# Patient Record
Sex: FEMALE | Race: WHITE | Employment: FULL TIME | URBAN - METROPOLITAN AREA
[De-identification: names, ages, dates, MRNs, and addresses within clinical notes are randomized per-mention and may not be internally consistent; named-entity substitution may affect disease eponyms.]

---

## 2017-02-02 PROBLEM — Z31.83 IN VITRO FERTILIZATION: Status: ACTIVE | Noted: 2017-02-02

## 2017-02-02 PROBLEM — I10 ESSENTIAL HYPERTENSION: Status: ACTIVE | Noted: 2017-02-02

## 2017-02-02 PROBLEM — O30.109: Status: ACTIVE | Noted: 2017-02-02

## 2017-02-02 PROCEDURE — 87340 HEPATITIS B SURFACE AG IA: CPT | Performed by: OBSTETRICS & GYNECOLOGY

## 2017-02-02 PROCEDURE — 87086 URINE CULTURE/COLONY COUNT: CPT | Performed by: OBSTETRICS & GYNECOLOGY

## 2017-02-02 PROCEDURE — 86850 RBC ANTIBODY SCREEN: CPT | Performed by: OBSTETRICS & GYNECOLOGY

## 2017-02-02 PROCEDURE — 36415 COLL VENOUS BLD VENIPUNCTURE: CPT | Performed by: OBSTETRICS & GYNECOLOGY

## 2017-02-02 PROCEDURE — 87591 N.GONORRHOEAE DNA AMP PROB: CPT | Performed by: OBSTETRICS & GYNECOLOGY

## 2017-02-02 PROCEDURE — 86900 BLOOD TYPING SEROLOGIC ABO: CPT | Performed by: OBSTETRICS & GYNECOLOGY

## 2017-02-02 PROCEDURE — 86780 TREPONEMA PALLIDUM: CPT | Performed by: OBSTETRICS & GYNECOLOGY

## 2017-02-02 PROCEDURE — 85025 COMPLETE CBC W/AUTO DIFF WBC: CPT | Performed by: OBSTETRICS & GYNECOLOGY

## 2017-02-02 PROCEDURE — 87389 HIV-1 AG W/HIV-1&-2 AB AG IA: CPT | Performed by: OBSTETRICS & GYNECOLOGY

## 2017-02-02 PROCEDURE — 86901 BLOOD TYPING SEROLOGIC RH(D): CPT | Performed by: OBSTETRICS & GYNECOLOGY

## 2017-02-02 PROCEDURE — 87491 CHLMYD TRACH DNA AMP PROBE: CPT | Performed by: OBSTETRICS & GYNECOLOGY

## 2017-02-02 PROCEDURE — 86762 RUBELLA ANTIBODY: CPT | Performed by: OBSTETRICS & GYNECOLOGY

## 2017-02-13 ENCOUNTER — OFFICE VISIT (OUTPATIENT)
Dept: PERINATAL CARE | Facility: HOSPITAL | Age: 32
End: 2017-02-13
Attending: OBSTETRICS & GYNECOLOGY
Payer: COMMERCIAL

## 2017-02-13 VITALS
HEART RATE: 76 BPM | SYSTOLIC BLOOD PRESSURE: 155 MMHG | HEIGHT: 64 IN | WEIGHT: 230 LBS | DIASTOLIC BLOOD PRESSURE: 96 MMHG | BODY MASS INDEX: 39.27 KG/M2

## 2017-02-13 DIAGNOSIS — I10 ESSENTIAL HYPERTENSION: Primary | ICD-10-CM

## 2017-02-13 DIAGNOSIS — Z31.83 IN VITRO FERTILIZATION: ICD-10-CM

## 2017-02-13 DIAGNOSIS — E28.2 PCOS (POLYCYSTIC OVARIAN SYNDROME): ICD-10-CM

## 2017-02-13 DIAGNOSIS — IMO0002: ICD-10-CM

## 2017-02-13 DIAGNOSIS — O30.109 TRIPLET PREGNANCY, ANTEPARTUM, UNSPECIFIED MULTIPLE GESTATION TYPE: ICD-10-CM

## 2017-02-13 PROCEDURE — 99244 OFF/OP CNSLTJ NEW/EST MOD 40: CPT

## 2017-02-13 NOTE — PROGRESS NOTES
Indication: ivf, htn, triplets.   ____________________________________________________________________________  History: Age: 27 years.  : 2 Para: 0.  ____________________________________________________________________________  Dating:  LMP: / subsequently divides into three separate individuals. The incidence of MZ twins is relatively stable worldwide at 4 to 5 per 1000 births.              The mortality rate of infants is higher in multiple than roger pregnancies because of the increased r recommended at age 29 for triplets. Growth restriction and discordant growth are frequent complications of multiple pregnancies and contribute to  morbidity and mortality.   The higher rate of IUGR in triplet pregnancies is probab associated with increased risk of birth defects including congenital heart defects ranging from 1.5 -6%. Fetal echocardiography is recommended in all IVF patients. We have also seen an increase in prematurity.             We discussed at length the sonya improve  outcome overall. 8.  Obtain baseline 24 hr urine and PIH labs. 9.  agree with stopping Lovenox  10.  check blood pressure at home twice per week     Thank you for allowing me to participate in the care of your patient.   Isaias

## 2017-02-17 ENCOUNTER — APPOINTMENT (OUTPATIENT)
Dept: ULTRASOUND IMAGING | Facility: HOSPITAL | Age: 32
End: 2017-02-17
Attending: EMERGENCY MEDICINE
Payer: COMMERCIAL

## 2017-02-17 ENCOUNTER — HOSPITAL ENCOUNTER (EMERGENCY)
Facility: HOSPITAL | Age: 32
Discharge: HOME | End: 2017-02-17
Attending: EMERGENCY MEDICINE
Payer: COMMERCIAL

## 2017-02-17 VITALS
BODY MASS INDEX: 37.56 KG/M2 | HEART RATE: 79 BPM | HEIGHT: 64 IN | SYSTOLIC BLOOD PRESSURE: 138 MMHG | TEMPERATURE: 97 F | WEIGHT: 220 LBS | RESPIRATION RATE: 18 BRPM | DIASTOLIC BLOOD PRESSURE: 93 MMHG | OXYGEN SATURATION: 99 %

## 2017-02-17 DIAGNOSIS — O20.9 VAGINAL BLEEDING BEFORE 22 WEEKS GESTATION: Primary | ICD-10-CM

## 2017-02-17 LAB
BASOPHILS # BLD AUTO: 0.04 X10(3) UL (ref 0–0.1)
BASOPHILS NFR BLD AUTO: 0.4 %
EOSINOPHIL # BLD AUTO: 0.15 X10(3) UL (ref 0–0.3)
EOSINOPHIL NFR BLD AUTO: 1.5 %
ERYTHROCYTE [DISTWIDTH] IN BLOOD BY AUTOMATED COUNT: 13.5 % (ref 11.5–16)
HCT VFR BLD AUTO: 33.1 % (ref 34–50)
HGB BLD-MCNC: 11.6 G/DL (ref 12–16)
IMMATURE GRANULOCYTE COUNT: 0.04 X10(3) UL (ref 0–1)
IMMATURE GRANULOCYTE RATIO %: 0.4 %
LYMPHOCYTES # BLD AUTO: 2.55 X10(3) UL (ref 0.9–4)
LYMPHOCYTES NFR BLD AUTO: 25.3 %
MCH RBC QN AUTO: 28.7 PG (ref 27–33.2)
MCHC RBC AUTO-ENTMCNC: 35 G/DL (ref 31–37)
MCV RBC AUTO: 81.9 FL (ref 81–100)
MONOCYTES # BLD AUTO: 0.59 X10(3) UL (ref 0.1–0.6)
MONOCYTES NFR BLD AUTO: 5.8 %
NEUTROPHIL ABS PRELIM: 6.72 X10 (3) UL (ref 1.3–6.7)
NEUTROPHILS # BLD AUTO: 6.72 X10(3) UL (ref 1.3–6.7)
NEUTROPHILS NFR BLD AUTO: 66.6 %
PLATELET # BLD AUTO: 319 10(3)UL (ref 150–450)
RBC # BLD AUTO: 4.04 X10(6)UL (ref 3.8–5.1)
RED CELL DISTRIBUTION WIDTH-SD: 39.8 FL (ref 35.1–46.3)
RH BLOOD TYPE: POSITIVE
WBC # BLD AUTO: 10.1 X10(3) UL (ref 4–13)

## 2017-02-17 PROCEDURE — 96360 HYDRATION IV INFUSION INIT: CPT

## 2017-02-17 PROCEDURE — 86900 BLOOD TYPING SEROLOGIC ABO: CPT | Performed by: EMERGENCY MEDICINE

## 2017-02-17 PROCEDURE — 99284 EMERGENCY DEPT VISIT MOD MDM: CPT

## 2017-02-17 PROCEDURE — 76817 TRANSVAGINAL US OBSTETRIC: CPT

## 2017-02-17 PROCEDURE — 84702 CHORIONIC GONADOTROPIN TEST: CPT | Performed by: EMERGENCY MEDICINE

## 2017-02-17 PROCEDURE — 96361 HYDRATE IV INFUSION ADD-ON: CPT

## 2017-02-17 PROCEDURE — 86901 BLOOD TYPING SEROLOGIC RH(D): CPT | Performed by: EMERGENCY MEDICINE

## 2017-02-17 PROCEDURE — 85025 COMPLETE CBC W/AUTO DIFF WBC: CPT | Performed by: EMERGENCY MEDICINE

## 2017-02-17 PROCEDURE — 99285 EMERGENCY DEPT VISIT HI MDM: CPT

## 2017-02-17 PROCEDURE — 76801 OB US < 14 WKS SINGLE FETUS: CPT

## 2017-02-17 PROCEDURE — 76802 OB US < 14 WKS ADDL FETUS: CPT

## 2017-02-17 RX ORDER — ENOXAPARIN SODIUM 150 MG/ML
INJECTION SUBCUTANEOUS DAILY
COMMUNITY
End: 2017-03-13 | Stop reason: ALTCHOICE

## 2017-02-17 NOTE — ED INITIAL ASSESSMENT (HPI)
Pt states having bleeding this am around 11. Pt states 11 weeks pregnancy with twins, was triplets spontaneous reduced to twins per last week u/s.

## 2017-02-17 NOTE — ED PROVIDER NOTES
Patient Seen in: [facility] Emergency Department    History   Patient presents with:  Pregnancy Issues (gynecologic)    Stated Complaint: vag bleeding, 11 wks pregnant    HPI    39-year-old white female who is a  2 para 0 who recently underwe Status: Never Smoker                      Smokeless Status: Never Used                        Alcohol Use: No                 Comment: socially.   one drink weekly max      Review of Systems    Positive for stated complaint: vag bleeding, 11 wks pregnant  O Notable for the following:     Hcg Quantitative >473970.0 (*)     All other components within normal limits   CBC W/ DIFFERENTIAL - Abnormal; Notable for the following:     HGB 11.6 (*)     HCT 33.1 (*)     Neutrophil Absolute Prelim 6.72 (*)     [facility] emergency room and laboratory studies sent. Workup reveals the known intrauterine fetal demise of fetus A.  B and C fetuses appear normal.  I spoke with her OB/GYN physician Dr. Matt Navarro and we discussed the case.   Patient will be discharged home to follow-u

## 2017-03-02 PROBLEM — O30.039 MONOCHORIONIC DIAMNIOTIC TWIN GESTATION: Status: ACTIVE | Noted: 2017-03-02

## 2017-03-20 ENCOUNTER — OFFICE VISIT (OUTPATIENT)
Dept: PERINATAL CARE | Facility: HOSPITAL | Age: 32
End: 2017-03-20
Attending: OBSTETRICS & GYNECOLOGY
Payer: COMMERCIAL

## 2017-03-20 VITALS
SYSTOLIC BLOOD PRESSURE: 153 MMHG | HEART RATE: 82 BPM | BODY MASS INDEX: 40 KG/M2 | DIASTOLIC BLOOD PRESSURE: 86 MMHG | WEIGHT: 234 LBS

## 2017-03-20 DIAGNOSIS — O30.032 MONOCHORIONIC DIAMNIOTIC TWIN GESTATION IN SECOND TRIMESTER: ICD-10-CM

## 2017-03-20 DIAGNOSIS — O16.2 HYPERTENSION COMPLICATING PREGNANCY, SECOND TRIMESTER: ICD-10-CM

## 2017-03-20 DIAGNOSIS — O09.812 HIGH RISK PREGNANCY DUE TO ASSISTED REPRODUCTIVE TECHNOLOGY IN SECOND TRIMESTER: ICD-10-CM

## 2017-03-20 DIAGNOSIS — IMO0002: Primary | ICD-10-CM

## 2017-03-20 PROCEDURE — 76810 OB US >/= 14 WKS ADDL FETUS: CPT

## 2017-03-20 PROCEDURE — 76820 UMBILICAL ARTERY ECHO: CPT

## 2017-03-20 PROCEDURE — 76805 OB US >/= 14 WKS SNGL FETUS: CPT

## 2017-03-20 PROCEDURE — 99214 OFFICE O/P EST MOD 30 MIN: CPT

## 2017-04-04 ENCOUNTER — OFFICE VISIT (OUTPATIENT)
Dept: PERINATAL CARE | Facility: HOSPITAL | Age: 32
End: 2017-04-04
Attending: OBSTETRICS & GYNECOLOGY
Payer: COMMERCIAL

## 2017-04-04 VITALS
DIASTOLIC BLOOD PRESSURE: 86 MMHG | SYSTOLIC BLOOD PRESSURE: 151 MMHG | HEART RATE: 95 BPM | WEIGHT: 234 LBS | BODY MASS INDEX: 40 KG/M2

## 2017-04-04 DIAGNOSIS — O09.812 PREGNANCY RESULTING FROM ASSISTED REPRODUCTIVE TECHNOLOGY, SECOND TRIMESTER: ICD-10-CM

## 2017-04-04 DIAGNOSIS — O30.032 MONOCHORIONIC DIAMNIOTIC TWIN GESTATION IN SECOND TRIMESTER: Primary | ICD-10-CM

## 2017-04-04 DIAGNOSIS — O30.109 TRIPLET PREGNANCY, ANTEPARTUM, UNSPECIFIED MULTIPLE GESTATION TYPE: ICD-10-CM

## 2017-04-04 DIAGNOSIS — O16.2 HYPERTENSION COMPLICATING PREGNANCY, SECOND TRIMESTER: ICD-10-CM

## 2017-04-04 PROCEDURE — 76820 UMBILICAL ARTERY ECHO: CPT

## 2017-04-04 PROCEDURE — 99214 OFFICE O/P EST MOD 30 MIN: CPT

## 2017-04-04 PROCEDURE — 76815 OB US LIMITED FETUS(S): CPT

## 2017-04-04 NOTE — PROGRESS NOTES
Servando Salazar    Dear Dr. Harrison Gardiner    Thank you for requesting ultrasound evaluation and maternal fetal medicine consultation on your patient Rosanna Head.   As you are aware she is a 27year old female  with a twin p Doppler:  Fetus 1:  Umbilical Artery: PS -61.5 cm/s    ED -10.43 cm/s    S/D ratio 2.88     RI 0.65     PI 0.98     TAMX -20.02 cm/s   Impression: normal umbilical artery blood flow (S/D ratio).      Fetus 2:  Umbilical Artery: PS -43.9 cm/s    ED -9.47 cm/ weeks  TTTS assessment every two weeks beginning at 16 weeks  assess AFV and bladder in both twins  measurement of middle cerebral artery peak systolic velocity (MCA-PSV) in both fetuses at 26 to 28 weeks for early detection of twin anemia-polycythemia seq

## 2017-04-20 ENCOUNTER — OFFICE VISIT (OUTPATIENT)
Dept: PERINATAL CARE | Facility: HOSPITAL | Age: 32
End: 2017-04-20
Attending: OBSTETRICS & GYNECOLOGY
Payer: COMMERCIAL

## 2017-04-20 VITALS
DIASTOLIC BLOOD PRESSURE: 86 MMHG | WEIGHT: 246 LBS | HEART RATE: 93 BPM | SYSTOLIC BLOOD PRESSURE: 144 MMHG | BODY MASS INDEX: 42 KG/M2

## 2017-04-20 DIAGNOSIS — I10 ESSENTIAL HYPERTENSION: ICD-10-CM

## 2017-04-20 DIAGNOSIS — O30.032 MONOCHORIONIC DIAMNIOTIC TWIN GESTATION IN SECOND TRIMESTER: Primary | ICD-10-CM

## 2017-04-20 DIAGNOSIS — O16.2 HYPERTENSION COMPLICATING PREGNANCY, SECOND TRIMESTER: ICD-10-CM

## 2017-04-20 PROCEDURE — 76815 OB US LIMITED FETUS(S): CPT

## 2017-04-20 PROCEDURE — 99213 OFFICE O/P EST LOW 20 MIN: CPT

## 2017-04-20 PROCEDURE — 76820 UMBILICAL ARTERY ECHO: CPT

## 2017-04-20 NOTE — PROGRESS NOTES
Finn Montes De Oca   Dear Dr. Susan Mcgregor   Thank you for requesting ultrasound evaluation and maternal fetal medicine consultation on your patient Radha Burnett.  As you are aware she is a 27year old female  with a twin preg ratio 2.75     RI 0.64     PI 0.95     TAMX 20.44 cm/s   Impression: normal umbilical artery blood flow (S/D ratio).      Fetus 2:  Umbilical Artery: PS 53.5 cm/s    ED 11.47 cm/s    S/D ratio 3.06     RI 0.67     PI 1.02     TAMX 20.37 cm/s   Impression: n echocardiogram at 22-24 weeks   Monthly growth ultrasounds   Weekly NST's at 32 weeks. Monitor for signs or symptoms of  labor, preeclampsia or fetal growth disturbances.    In the absence of other maternal or fetal indications, delivery for monoch

## 2017-04-24 PROCEDURE — 84156 ASSAY OF PROTEIN URINE: CPT | Performed by: OBSTETRICS & GYNECOLOGY

## 2017-05-02 ENCOUNTER — OFFICE VISIT (OUTPATIENT)
Dept: PERINATAL CARE | Facility: HOSPITAL | Age: 32
End: 2017-05-02
Attending: OBSTETRICS & GYNECOLOGY
Payer: COMMERCIAL

## 2017-05-02 VITALS
BODY MASS INDEX: 42 KG/M2 | WEIGHT: 246 LBS | SYSTOLIC BLOOD PRESSURE: 147 MMHG | DIASTOLIC BLOOD PRESSURE: 93 MMHG | HEART RATE: 84 BPM

## 2017-05-02 DIAGNOSIS — Z31.83 IN VITRO FERTILIZATION: ICD-10-CM

## 2017-05-02 DIAGNOSIS — O30.109 TRIPLET PREGNANCY, ANTEPARTUM, UNSPECIFIED MULTIPLE GESTATION TYPE: Primary | ICD-10-CM

## 2017-05-02 DIAGNOSIS — O30.032 MONOCHORIONIC DIAMNIOTIC TWIN GESTATION IN SECOND TRIMESTER: ICD-10-CM

## 2017-05-02 DIAGNOSIS — I10 ESSENTIAL HYPERTENSION: ICD-10-CM

## 2017-05-02 DIAGNOSIS — O16.2 HYPERTENSION COMPLICATING PREGNANCY, SECOND TRIMESTER: ICD-10-CM

## 2017-05-02 PROCEDURE — 99214 OFFICE O/P EST MOD 30 MIN: CPT

## 2017-05-02 NOTE — PROGRESS NOTES
Indication: ivf, htn, mono/di. ____________________________________________________________________________  History: Age: 34 years.  : 2 Para: 0.  ____________________________________________________________________________  Dating:  LMP: 20 Ratio 1.090  13th%  FL/AC Ratio 0.205  n/a  BPD/FL Ratio 1.400  17th%  HC/FL Ratio 5.311  52nd%  EFW (lbs/oz) 0 lbs 15 ozs  EFW (g) 428 g  n/a     Fetal heart activity: present. Fetal heart rate: 149 bpm.   Fetal presentation: transverse.    Amniotic fluid: I informed the patient that chronic hypertension increases the risk of pre-eclampsia, placental abruption, IUGR and fetal demise and possible need for  delivery. The signs and symptoms of preeclampsia were discussed. S:  Doing well.   No compl to call with any questions or concerns. Total patient time was  25 minutes in evaluation, consultation, and coordination of care. Greater than 50% of this time was spent in face to face discussion with the patient.

## 2017-05-16 ENCOUNTER — OFFICE VISIT (OUTPATIENT)
Dept: PERINATAL CARE | Facility: HOSPITAL | Age: 32
End: 2017-05-16
Attending: OBSTETRICS & GYNECOLOGY
Payer: COMMERCIAL

## 2017-05-16 VITALS
DIASTOLIC BLOOD PRESSURE: 81 MMHG | HEART RATE: 99 BPM | WEIGHT: 253 LBS | SYSTOLIC BLOOD PRESSURE: 139 MMHG | BODY MASS INDEX: 43 KG/M2

## 2017-05-16 DIAGNOSIS — O09.812 PREGNANCY RESULTING FROM ASSISTED REPRODUCTIVE TECHNOLOGY, SECOND TRIMESTER: ICD-10-CM

## 2017-05-16 DIAGNOSIS — O30.109 TRIPLET PREGNANCY, ANTEPARTUM, UNSPECIFIED MULTIPLE GESTATION TYPE: ICD-10-CM

## 2017-05-16 DIAGNOSIS — O16.2 HYPERTENSION COMPLICATING PREGNANCY, SECOND TRIMESTER: ICD-10-CM

## 2017-05-16 DIAGNOSIS — I10 ESSENTIAL HYPERTENSION: ICD-10-CM

## 2017-05-16 DIAGNOSIS — O30.042 DICHORIONIC DIAMNIOTIC TWIN PREGNANCY IN SECOND TRIMESTER: Primary | ICD-10-CM

## 2017-05-16 PROBLEM — O30.039 MONOCHORIONIC DIAMNIOTIC TWIN GESTATION: Status: RESOLVED | Noted: 2017-03-02 | Resolved: 2017-05-16

## 2017-05-16 PROCEDURE — 93325 DOPPLER ECHO COLOR FLOW MAPG: CPT

## 2017-05-16 PROCEDURE — 76827 ECHO EXAM OF FETAL HEART: CPT

## 2017-05-16 PROCEDURE — 99215 OFFICE O/P EST HI 40 MIN: CPT

## 2017-05-16 PROCEDURE — 76815 OB US LIMITED FETUS(S): CPT

## 2017-05-16 PROCEDURE — 76825 ECHO EXAM OF FETAL HEART: CPT

## 2017-05-16 NOTE — PROGRESS NOTES
Finn Montes De Oca    Dear Dr. oLnnie Gamboa    Thank you for requesting maternal fetal medicine consultation and echocardiogram on your patient Radha Burnett.   As you are aware she is a 34year old female  with a twin pregnancy trachea  Rhythm:  Sinus    Fetus 2:  : Aubrey Gasca MD    Image quality:  Adequate  Situs:  Normal  Position of stomach:  Left sided  Heart size:  Normal  Position of apex:  normal  Systemic veins:  Normal  Pulmonary veins:  suboptimal  Atrial s methyldopa 1000 mg p.o. 3 times daily, OB recently added labetalol 100 mg po q PM    RECOMMENDATIONS:  · Continue care with Dr. Romayne Awkward monthly growth ultrasounds. Weekly NST's at 32 weeks.   Monitor for signs or symptoms of  labor, preeclam

## 2017-05-17 PROBLEM — O30.039 MONOCHORIONIC DIAMNIOTIC TWIN GESTATION: Status: RESOLVED | Noted: 2017-05-17 | Resolved: 2017-05-17

## 2017-05-17 PROBLEM — O30.042 DICHORIONIC DIAMNIOTIC TWIN PREGNANCY IN SECOND TRIMESTER: Status: RESOLVED | Noted: 2017-05-16 | Resolved: 2017-05-17

## 2017-05-17 PROBLEM — O30.039 MONOCHORIONIC DIAMNIOTIC TWIN GESTATION: Status: ACTIVE | Noted: 2017-05-17

## 2017-05-19 ENCOUNTER — APPOINTMENT (OUTPATIENT)
Dept: ULTRASOUND IMAGING | Age: 32
End: 2017-05-19
Attending: EMERGENCY MEDICINE
Payer: COMMERCIAL

## 2017-05-19 ENCOUNTER — HOSPITAL ENCOUNTER (EMERGENCY)
Age: 32
Discharge: HOME | End: 2017-05-19
Attending: EMERGENCY MEDICINE
Payer: COMMERCIAL

## 2017-05-19 VITALS
HEART RATE: 74 BPM | DIASTOLIC BLOOD PRESSURE: 72 MMHG | SYSTOLIC BLOOD PRESSURE: 139 MMHG | BODY MASS INDEX: 41.32 KG/M2 | HEIGHT: 65 IN | OXYGEN SATURATION: 99 % | WEIGHT: 248 LBS | RESPIRATION RATE: 20 BRPM | TEMPERATURE: 98 F

## 2017-05-19 DIAGNOSIS — R07.89 CHEST PAIN, NON-CARDIAC: Primary | ICD-10-CM

## 2017-05-19 DIAGNOSIS — O30.002 TWIN GESTATION IN SECOND TRIMESTER, UNSPECIFIED MULTIPLE GESTATION TYPE: ICD-10-CM

## 2017-05-19 PROCEDURE — 99285 EMERGENCY DEPT VISIT HI MDM: CPT

## 2017-05-19 PROCEDURE — 81001 URINALYSIS AUTO W/SCOPE: CPT | Performed by: EMERGENCY MEDICINE

## 2017-05-19 PROCEDURE — 93971 EXTREMITY STUDY: CPT | Performed by: EMERGENCY MEDICINE

## 2017-05-19 PROCEDURE — 84484 ASSAY OF TROPONIN QUANT: CPT | Performed by: EMERGENCY MEDICINE

## 2017-05-19 PROCEDURE — 93005 ELECTROCARDIOGRAM TRACING: CPT

## 2017-05-19 PROCEDURE — 93010 ELECTROCARDIOGRAM REPORT: CPT

## 2017-05-19 PROCEDURE — 80053 COMPREHEN METABOLIC PANEL: CPT | Performed by: EMERGENCY MEDICINE

## 2017-05-19 PROCEDURE — 87086 URINE CULTURE/COLONY COUNT: CPT | Performed by: EMERGENCY MEDICINE

## 2017-05-19 PROCEDURE — 36415 COLL VENOUS BLD VENIPUNCTURE: CPT

## 2017-05-19 PROCEDURE — 85025 COMPLETE CBC W/AUTO DIFF WBC: CPT | Performed by: EMERGENCY MEDICINE

## 2017-05-20 NOTE — ED NOTES
PT RETURNED FROM ULTRASOUND ON CART PER TECH. STS RETURNING PAIN. MD NOTIFIED. PT DECLINES PAIN MEDICATION.

## 2017-05-20 NOTE — ED PROVIDER NOTES
Patient Seen in: THE [facility] Emergency Department In [place]    History   Patient presents with:  Chest Pain Angina (cardiovascular)    Stated Complaint: chest pain times 2 days 24 weeks preg     HPI    24-year-old woman who is 24 weeks pregnant with twins vital signs reviewed. All other systems reviewed and negative except as noted above. PSFH elements reviewed from today and agreed except as otherwise stated in HPI.     Physical Exam       ED Triage Vitals   BP 05/19/17 1934 133/76 mmHg   Pulse 05/1 for panel order CBC WITH DIFFERENTIAL WITH PLATELET.   Procedure                               Abnormality         Status                     ---------                               -----------         ------                     CBC W/ DIFFERENTIAL[[id number]

## 2017-06-12 ENCOUNTER — OFFICE VISIT (OUTPATIENT)
Dept: FAMILY MEDICINE CLINIC | Facility: CLINIC | Age: 32
End: 2017-06-12

## 2017-06-12 VITALS
RESPIRATION RATE: 18 BRPM | OXYGEN SATURATION: 98 % | TEMPERATURE: 98 F | WEIGHT: 272 LBS | SYSTOLIC BLOOD PRESSURE: 120 MMHG | DIASTOLIC BLOOD PRESSURE: 80 MMHG | HEART RATE: 69 BPM | BODY MASS INDEX: 45 KG/M2

## 2017-06-12 DIAGNOSIS — H69.93 EUSTACHIAN TUBE DISORDER, BILATERAL: Primary | ICD-10-CM

## 2017-06-12 PROCEDURE — 99203 OFFICE O/P NEW LOW 30 MIN: CPT | Performed by: PHYSICIAN ASSISTANT

## 2017-06-12 RX ORDER — FLUTICASONE PROPIONATE 50 MCG
2 SPRAY, SUSPENSION (ML) NASAL DAILY
Qty: 1 BOTTLE | Refills: 0 | Status: SHIPPED | OUTPATIENT
Start: 2017-06-12 | End: 2017-06-30

## 2017-06-12 NOTE — PROGRESS NOTES
CHIEF COMPLAINT:   Patient presents with:  Ear Pain: pt c\o of ear pain rt ear, x 2days         HPI:   Radha Dorsey is a 34year old female who presents for right ear pain for 2 days. Admits to stuffy nose. Denies decreased hearing/discharge/tinnitus. respiratory rate. Normal effort. No wheezing. No rales or crackles. . No decreased BS. CARDIO: RRR without murmur  LYMPH: No cervical or supraclavicular lymphadenopathy.        ASSESSMENT AND PLAN:       Eustachian tube disorder, bilateral  -     Flutica

## 2017-06-14 ENCOUNTER — HOSPITAL ENCOUNTER (INPATIENT)
Facility: HOSPITAL | Age: 32
LOS: 7 days | Discharge: HOME | End: 2017-06-21
Attending: OBSTETRICS & GYNECOLOGY | Admitting: OBSTETRICS & GYNECOLOGY
Payer: COMMERCIAL

## 2017-06-14 PROBLEM — Z34.90 PREGNANCY: Status: ACTIVE | Noted: 2017-06-14

## 2017-06-14 RX ORDER — BETAMETHASONE SODIUM PHOSPHATE AND BETAMETHASONE ACETATE 3; 3 MG/ML; MG/ML
12 INJECTION, SUSPENSION INTRA-ARTICULAR; INTRALESIONAL; INTRAMUSCULAR; SOFT TISSUE EVERY 24 HOURS
Status: DISCONTINUED | OUTPATIENT
Start: 2017-06-14 | End: 2017-06-14

## 2017-06-14 RX ORDER — ASPIRIN 81 MG/1
81 TABLET ORAL DAILY
Status: DISCONTINUED | OUTPATIENT
Start: 2017-06-14 | End: 2017-06-17 | Stop reason: HOSPADM

## 2017-06-14 RX ORDER — FLUTICASONE PROPIONATE 50 MCG
2 SPRAY, SUSPENSION (ML) NASAL DAILY
Status: DISCONTINUED | OUTPATIENT
Start: 2017-06-14 | End: 2017-06-17 | Stop reason: HOSPADM

## 2017-06-14 RX ORDER — LABETALOL 200 MG/1
100 TABLET, FILM COATED ORAL DAILY
Status: DISCONTINUED | OUTPATIENT
Start: 2017-06-14 | End: 2017-06-15

## 2017-06-14 RX ORDER — METHYLDOPA 250 MG/1
1000 TABLET, FILM COATED ORAL 3 TIMES DAILY
Status: DISCONTINUED | OUTPATIENT
Start: 2017-06-14 | End: 2017-06-17 | Stop reason: HOSPADM

## 2017-06-14 RX ORDER — BETAMETHASONE SODIUM PHOSPHATE AND BETAMETHASONE ACETATE 3; 3 MG/ML; MG/ML
12 INJECTION, SUSPENSION INTRA-ARTICULAR; INTRALESIONAL; INTRAMUSCULAR; SOFT TISSUE EVERY 24 HOURS
Status: COMPLETED | OUTPATIENT
Start: 2017-06-14 | End: 2017-06-15

## 2017-06-14 NOTE — PLAN OF CARE
Problem: Patient/Family Goals  Goal: Patient/Family Long Term Goal  Patient’s Long Term Goal: Maintain pregnancy    Interventions:  -  - See additional Care Plan goals for specific interventions   Outcome: Progressing  Goal: Patient/Family Short Term Goal

## 2017-06-14 NOTE — H&P
[facility]  History & Physical    SUBJECTIVE:    Angely Lehman is a 34year old   mono/di twin gestation at 33w11d who presents with elevated BP in the office. Pt with history of cHTN, on methyldopa and labetalol.     IVF conception, origin [address] 5' 4\" (1.626 m) 270 lb (122.471 kg)   06/14/17 1254 146/88 mmHg 86 - -   06/14/17 1224 151/89 mmHg 93 - -   06/14/17 1209 150/90 mmHg 94 - -   06/14/17 1154 148/84 mmHg 85 - -   06/14/17 1139 146/89 mmHg 90 - -   06/14/17 1124 141/80 mmHg 83 - -

## 2017-06-14 NOTE — CONSULTS
[facility] Patient Status:  Observation    1985 MRN [id number]   [facility] 1NW-A Attending Rachelle Medina MD   Hosp Day # 0 PCP Bentley Zayas MD     SUBJECTIVE:  Reason for Admission: Social History:     Smoking status: Never Smoker     Smokeless tobacco: Never Used    Alcohol Use: No    Comment: socially.   one drink weekly max        Review of Systems:  Unremarkable except as above    OBJECTIVE:  Pulse:  [82-94] 86  BP: (141-178) fossa, cavum septi pellucidi.   Spine: appears normal but suboptimal  Heart: suboptimal.    Fetus 2:  Biometry:  BPD 65.4 mm 7th% 26w3d (25w5d to 27w1d)  .2 mm <5th% 26w0d (24w0d to 28w0d)  .4 mm 50th% 28w0d (27w2d to 28w5d)  FL 51.2 mm 26th% 27 Review:     Lab Results  Component Value Date   WBC 12.2 06/14/2017   HGB 11.0 06/14/2017   HCT 32.1 06/14/2017   .0 06/14/2017   CREATSERUM 0.76 06/14/2017   BUN 6 06/14/2017    06/14/2017   K 4.1 06/14/2017    06/14/2017   CO2 23.0 06/ (5 to 7 percent) in women who had mild preeclampsia during the first pregnancy.              The ability to predict preeclampsia is currently of limited benefit because neither the development of the disorder nor its progression from mild to severe disease

## 2017-06-14 NOTE — IMAGING NOTE
Indication: htn.   ____________________________________________________________________________  History: Age: 34 years.  : 2 Para: 0.  ____________________________________________________________________________  Dating:  LMP: 2016 EDC: / Intertwin EFW discordance: 3.7 %. Fetal Anatomy:  Visualized with normal appearance: head, gastrointestinal tract, kidneys, bladder. Brain: Visualized and normal appearance: brain parenchyma, cerebellum, posterior fossa.   Spine: appears normal but

## 2017-06-15 RX ORDER — LABETALOL 200 MG/1
200 TABLET, FILM COATED ORAL 2 TIMES DAILY
Status: DISCONTINUED | OUTPATIENT
Start: 2017-06-15 | End: 2017-06-17 | Stop reason: HOSPADM

## 2017-06-15 RX ORDER — CALCIUM CARBONATE 200(500)MG
500 TABLET,CHEWABLE ORAL 3 TIMES DAILY PRN
Status: DISCONTINUED | OUTPATIENT
Start: 2017-06-15 | End: 2017-06-17 | Stop reason: HOSPADM

## 2017-06-15 NOTE — PROGRESS NOTES
Pt complaints: none    /78 mmHg  Pulse 86  Temp(Src) 98.4 °F (36.9 °C) (Oral)  Resp 20  Ht 5' 4\" (1.626 m)  Wt 270 lb (122.471 kg)  BMI 46.32 kg/m2  LMP 11/29/2016    Abdomen: gravid, nontender  Cervix: deferred  Extremities: no edema, symmetric and

## 2017-06-15 NOTE — CONSULTS
[facility]  Maternal Fetal Medicine Progress Note    Radha Dorsey Patient Status:  Inpatient    1985 MRN [id number]   [facility] 1NW-A Attending Bryant Shah MD   Hosp Day # 1 PCP Lois Martinez MD     SUBJECTIVE:  Patient of chronic hypertension versus superimposed preeclampsia: 24 hour urine pending. Serum labs not reflective of severe features of preeclampsia.     RECOMENDATIONS:   · Complete 24 hour urine collection  · Complete betamethsone  · Increase labetalol to 200 m

## 2017-06-16 RX ORDER — CHOLECALCIFEROL (VITAMIN D3) 25 MCG
1 TABLET,CHEWABLE ORAL DAILY
Status: DISCONTINUED | OUTPATIENT
Start: 2017-06-17 | End: 2017-06-17 | Stop reason: HOSPADM

## 2017-06-16 NOTE — PROGRESS NOTES
Patient will be using their own BP cuff and monitor at home. BP on hospital equipment 128/71 on home equipment 131/68. Patient wanted to compare units.

## 2017-06-16 NOTE — CONSULTS
[facility]  Maternal Fetal Medicine Progress Note    Jarad Salazar Patient Status:  Inpatient    1985 MRN [id number]   [facility] 1NW-A Attending Katie Cates MD   Hosp Day # 2 PCP Marylene Bad, MD     SUBJECTIVE:  Patient p.o. 3 times daily  Repeat labs in AM.  If discharged:   Twice-weekly antepartum monitoring consisting of weekly NSTs and weekly BPP's and umbilical artery Doppler assessments   Continue growth ultrasounds every 2 weeks   Serum laboratory studies every 1-2

## 2017-06-16 NOTE — CM/SW NOTE
06/16/17 0900   CM/SW Referral Data   Referral Source Social Work (self-referral)   Reason for Referral Psychoscial assessment   Informant Patient   SW met with pt due to her antepartum admission. Pt presented with a bright affect and mood.  Pt state

## 2017-06-17 ENCOUNTER — SURGERY (OUTPATIENT)
Age: 32
End: 2017-06-17

## 2017-06-17 ENCOUNTER — ANESTHESIA (OUTPATIENT)
Dept: OBGYN UNIT | Facility: HOSPITAL | Age: 32
End: 2017-06-17
Payer: COMMERCIAL

## 2017-06-17 ENCOUNTER — ANESTHESIA EVENT (OUTPATIENT)
Dept: OBGYN UNIT | Facility: HOSPITAL | Age: 32
End: 2017-06-17
Payer: COMMERCIAL

## 2017-06-17 PROBLEM — Z34.90 PREGNANT: Status: ACTIVE | Noted: 2017-06-17

## 2017-06-17 PROCEDURE — 59514 CESAREAN DELIVERY ONLY: CPT | Performed by: OBSTETRICS & GYNECOLOGY

## 2017-06-17 RX ORDER — DOCUSATE SODIUM 100 MG/1
100 CAPSULE, LIQUID FILLED ORAL
Status: DISCONTINUED | OUTPATIENT
Start: 2017-06-18 | End: 2017-06-21

## 2017-06-17 RX ORDER — LABETALOL HYDROCHLORIDE 5 MG/ML
40 INJECTION, SOLUTION INTRAVENOUS ONCE
Status: COMPLETED | OUTPATIENT
Start: 2017-06-17 | End: 2017-06-17

## 2017-06-17 RX ORDER — LABETALOL HYDROCHLORIDE 5 MG/ML
20 INJECTION, SOLUTION INTRAVENOUS ONCE AS NEEDED
Status: COMPLETED | OUTPATIENT
Start: 2017-06-17 | End: 2017-06-17

## 2017-06-17 RX ORDER — LABETALOL HYDROCHLORIDE 5 MG/ML
20 INJECTION, SOLUTION INTRAVENOUS
Status: DISCONTINUED | OUTPATIENT
Start: 2017-06-17 | End: 2017-06-21

## 2017-06-17 RX ORDER — HYDROCODONE BITARTRATE AND ACETAMINOPHEN 5; 325 MG/1; MG/1
1 TABLET ORAL EVERY 4 HOURS PRN
Status: DISCONTINUED | OUTPATIENT
Start: 2017-06-17 | End: 2017-06-21

## 2017-06-17 RX ORDER — LABETALOL HYDROCHLORIDE 5 MG/ML
INJECTION, SOLUTION INTRAVENOUS
Status: COMPLETED
Start: 2017-06-17 | End: 2017-06-17

## 2017-06-17 RX ORDER — KETOROLAC TROMETHAMINE 30 MG/ML
30 INJECTION, SOLUTION INTRAMUSCULAR; INTRAVENOUS EVERY 6 HOURS PRN
Status: DISPENSED | OUTPATIENT
Start: 2017-06-17 | End: 2017-06-18

## 2017-06-17 RX ORDER — SODIUM CHLORIDE, SODIUM LACTATE, POTASSIUM CHLORIDE, CALCIUM CHLORIDE 600; 310; 30; 20 MG/100ML; MG/100ML; MG/100ML; MG/100ML
INJECTION, SOLUTION INTRAVENOUS CONTINUOUS
Status: DISCONTINUED | OUTPATIENT
Start: 2017-06-17 | End: 2017-06-17 | Stop reason: HOSPADM

## 2017-06-17 RX ORDER — ONDANSETRON 2 MG/ML
4 INJECTION INTRAMUSCULAR; INTRAVENOUS EVERY 6 HOURS PRN
Status: DISCONTINUED | OUTPATIENT
Start: 2017-06-17 | End: 2017-06-21

## 2017-06-17 RX ORDER — BISACODYL 10 MG
10 SUPPOSITORY, RECTAL RECTAL
Status: DISCONTINUED | OUTPATIENT
Start: 2017-06-17 | End: 2017-06-21

## 2017-06-17 RX ORDER — ONDANSETRON 2 MG/ML
4 INJECTION INTRAMUSCULAR; INTRAVENOUS ONCE AS NEEDED
Status: DISCONTINUED | OUTPATIENT
Start: 2017-06-17 | End: 2017-06-17 | Stop reason: HOSPADM

## 2017-06-17 RX ORDER — CALCIUM GLUCONATE 94 MG/ML
1 INJECTION, SOLUTION INTRAVENOUS ONCE AS NEEDED
Status: DISCONTINUED | OUTPATIENT
Start: 2017-06-17 | End: 2017-06-17 | Stop reason: HOSPADM

## 2017-06-17 RX ORDER — DEXTROSE, SODIUM CHLORIDE, SODIUM LACTATE, POTASSIUM CHLORIDE, AND CALCIUM CHLORIDE 5; .6; .31; .03; .02 G/100ML; G/100ML; G/100ML; G/100ML; G/100ML
INJECTION, SOLUTION INTRAVENOUS CONTINUOUS
Status: DISCONTINUED | OUTPATIENT
Start: 2017-06-17 | End: 2017-06-21

## 2017-06-17 RX ORDER — HYDROCODONE BITARTRATE AND ACETAMINOPHEN 10; 325 MG/1; MG/1
1 TABLET ORAL EVERY 4 HOURS PRN
Status: DISCONTINUED | OUTPATIENT
Start: 2017-06-17 | End: 2017-06-21

## 2017-06-17 RX ORDER — METHYLDOPA 250 MG/1
1000 TABLET, FILM COATED ORAL 3 TIMES DAILY
Status: DISCONTINUED | OUTPATIENT
Start: 2017-06-17 | End: 2017-06-21

## 2017-06-17 RX ORDER — HYDROMORPHONE HYDROCHLORIDE 1 MG/ML
0.4 INJECTION, SOLUTION INTRAMUSCULAR; INTRAVENOUS; SUBCUTANEOUS EVERY 5 MIN PRN
Status: ACTIVE | OUTPATIENT
Start: 2017-06-17 | End: 2017-06-18

## 2017-06-17 RX ORDER — GENTAMICIN SULFATE 40 MG/ML
INJECTION, SOLUTION INTRAMUSCULAR; INTRAVENOUS
Status: DISPENSED
Start: 2017-06-17 | End: 2017-06-17

## 2017-06-17 RX ORDER — SIMETHICONE 80 MG
80 TABLET,CHEWABLE ORAL 3 TIMES DAILY PRN
Status: DISCONTINUED | OUTPATIENT
Start: 2017-06-17 | End: 2017-06-21

## 2017-06-17 RX ORDER — GENTAMICIN SULFATE 40 MG/ML
5 INJECTION, SOLUTION INTRAMUSCULAR; INTRAVENOUS ONCE
Status: COMPLETED | OUTPATIENT
Start: 2017-06-17 | End: 2017-06-17

## 2017-06-17 RX ORDER — LABETALOL 100 MG/1
100 TABLET, FILM COATED ORAL EVERY 12 HOURS SCHEDULED
Status: DISCONTINUED | OUTPATIENT
Start: 2017-06-17 | End: 2017-06-18

## 2017-06-17 RX ORDER — NALBUPHINE HCL 10 MG/ML
2.5 AMPUL (ML) INJECTION EVERY 4 HOURS PRN
Status: DISCONTINUED | OUTPATIENT
Start: 2017-06-17 | End: 2017-06-21

## 2017-06-17 RX ORDER — SODIUM CHLORIDE, SODIUM LACTATE, POTASSIUM CHLORIDE, CALCIUM CHLORIDE 600; 310; 30; 20 MG/100ML; MG/100ML; MG/100ML; MG/100ML
INJECTION, SOLUTION INTRAVENOUS CONTINUOUS
Status: DISCONTINUED | OUTPATIENT
Start: 2017-06-17 | End: 2017-06-21

## 2017-06-17 RX ORDER — LABETALOL HYDROCHLORIDE 5 MG/ML
40 INJECTION, SOLUTION INTRAVENOUS ONCE AS NEEDED
Status: DISCONTINUED | OUTPATIENT
Start: 2017-06-18 | End: 2017-06-17 | Stop reason: HOSPADM

## 2017-06-17 RX ORDER — DIPHENHYDRAMINE HYDROCHLORIDE 50 MG/ML
25 INJECTION INTRAMUSCULAR; INTRAVENOUS ONCE AS NEEDED
Status: DISCONTINUED | OUTPATIENT
Start: 2017-06-17 | End: 2017-06-17 | Stop reason: HOSPADM

## 2017-06-17 RX ORDER — CLINDAMYCIN PHOSPHATE 900 MG/50ML
900 INJECTION INTRAVENOUS ONCE
Status: DISCONTINUED | OUTPATIENT
Start: 2017-06-17 | End: 2017-06-17 | Stop reason: HOSPADM

## 2017-06-17 RX ORDER — KETOROLAC TROMETHAMINE 30 MG/ML
30 INJECTION, SOLUTION INTRAMUSCULAR; INTRAVENOUS ONCE AS NEEDED
Status: DISCONTINUED | OUTPATIENT
Start: 2017-06-17 | End: 2017-06-17 | Stop reason: HOSPADM

## 2017-06-17 RX ORDER — IBUPROFEN 600 MG/1
600 TABLET ORAL EVERY 6 HOURS SCHEDULED
Status: DISCONTINUED | OUTPATIENT
Start: 2017-06-18 | End: 2017-06-21

## 2017-06-17 RX ORDER — NALOXONE HYDROCHLORIDE 0.4 MG/ML
0.08 INJECTION, SOLUTION INTRAMUSCULAR; INTRAVENOUS; SUBCUTANEOUS
Status: ACTIVE | OUTPATIENT
Start: 2017-06-17 | End: 2017-06-18

## 2017-06-17 RX ORDER — SODIUM CHLORIDE 9 MG/ML
100 INJECTION, SOLUTION INTRAVENOUS ONCE
Status: DISCONTINUED | OUTPATIENT
Start: 2017-06-17 | End: 2017-06-17 | Stop reason: HOSPADM

## 2017-06-17 RX ORDER — DIPHENHYDRAMINE HYDROCHLORIDE 50 MG/ML
12.5 INJECTION INTRAMUSCULAR; INTRAVENOUS EVERY 4 HOURS PRN
Status: DISCONTINUED | OUTPATIENT
Start: 2017-06-17 | End: 2017-06-21

## 2017-06-17 RX ORDER — LABETALOL HYDROCHLORIDE 5 MG/ML
20 INJECTION, SOLUTION INTRAVENOUS ONCE
Status: DISCONTINUED | OUTPATIENT
Start: 2017-06-17 | End: 2017-06-17

## 2017-06-17 RX ORDER — LABETALOL HYDROCHLORIDE 5 MG/ML
INJECTION, SOLUTION INTRAVENOUS
Status: DISPENSED
Start: 2017-06-17 | End: 2017-06-17

## 2017-06-17 RX ORDER — DIPHENHYDRAMINE HCL 25 MG
25 CAPSULE ORAL EVERY 4 HOURS PRN
Status: DISCONTINUED | OUTPATIENT
Start: 2017-06-17 | End: 2017-06-21

## 2017-06-17 RX ORDER — LABETALOL HYDROCHLORIDE 5 MG/ML
80 INJECTION, SOLUTION INTRAVENOUS ONCE AS NEEDED
Status: COMPLETED | OUTPATIENT
Start: 2017-06-19 | End: 2017-06-17

## 2017-06-17 RX ORDER — HYDROMORPHONE HYDROCHLORIDE 1 MG/ML
0.4 INJECTION, SOLUTION INTRAMUSCULAR; INTRAVENOUS; SUBCUTANEOUS EVERY 2 HOUR PRN
Status: ACTIVE | OUTPATIENT
Start: 2017-06-17 | End: 2017-06-18

## 2017-06-17 RX ORDER — HYDRALAZINE HYDROCHLORIDE 20 MG/ML
10 INJECTION INTRAMUSCULAR; INTRAVENOUS ONCE AS NEEDED
Status: DISCONTINUED | OUTPATIENT
Start: 2017-06-20 | End: 2017-06-17 | Stop reason: HOSPADM

## 2017-06-17 RX ORDER — ZOLPIDEM TARTRATE 5 MG/1
5 TABLET ORAL NIGHTLY PRN
Status: DISCONTINUED | OUTPATIENT
Start: 2017-06-17 | End: 2017-06-21

## 2017-06-17 RX ORDER — NALBUPHINE HCL 10 MG/ML
2.5 AMPUL (ML) INJECTION
Status: DISCONTINUED | OUTPATIENT
Start: 2017-06-17 | End: 2017-06-18 | Stop reason: HOSPADM

## 2017-06-17 NOTE — PROGRESS NOTES
SUBJECTIVE:   pt without complaints. No HA, no vis changes, no RUQ pain. OBJECTIVE:  VS:  height is 5' 4\" (1.626 m) and weight is 270 lb (122.471 kg). Her oral temperature is 69.8 °F (21 °C). Her blood pressure is 162/89 and her pulse is 73.  Her resp

## 2017-06-17 NOTE — ANESTHESIA POSTPROCEDURE EVALUATION
[address] [facility] Patient Status:  Inpatient   Age/Gender 34year old female MRN [id number]   [facility] 1NW-A Attending Kendrick Blanc MD   Hosp Day # 3 PCP Nicanor Chambers MD       Anesthesia Post-op Note    Procedure(s):

## 2017-06-17 NOTE — PLAN OF CARE
Problem: POSTPARTUM  Goal: Optimize infant feeding at the breast  INTERVENTIONS:  - Assess support systems available to mother/family.  - Identify cultural beliefs/practices regarding lactation, letdown techniques, maternal food preferences.   - Assess moth and actively listen. - Educate father/SO about benefits of breast feeding and how to manage common lactation challenges.   - Recommend avoidance of specific medications or substances incompatible with breast feeding.  - Assess and monitor for signs of nipp

## 2017-06-17 NOTE — OPERATIVE REPORT
OPERATIVE REPORT   Preoperative Diagnosis: primary  section r/t chronic hypertension with superimposed severe pre-eclampsia  Postoperative Diagnosis: same  Primary surgeon:  Surgeon(s) and Role:     * Troy Ku MD - Primary     Dayday Acuna  was clamped and cut after 39 second delay  Baby was handed to waiting NICU team.  Bag on Baby B was ruptured, clear fluid and Baby B delivered from estephanie breech presentation in like Ellery without complication.   After 45 sec delay cord clamped and cut, baby

## 2017-06-17 NOTE — PROGRESS NOTES
2338-DR CROWLEY NOTIFIED OF ELEVATED B/PS, 164/87,170/91,169/89 ORDERS TO START HYPERTENSION DATA FORM

## 2017-06-17 NOTE — PLAN OF CARE
Problem: SAFETY ADULT - FALL  Goal: Free from fall injury  INTERVENTIONS:  - Assess pt frequently for physical needs  - Identify cognitive and physical deficits and behaviors that affect risk of falls.   - [place] fall precautions as indicated by assessme

## 2017-06-17 NOTE — ANESTHESIA PREPROCEDURE EVALUATION
PRE-OP EVALUATION    Patient Name: Harley Bauman    Pre-op Diagnosis: primary  section r/t chronic hypertension with superimposed severe pre-eclampsia    Procedure(s):      Surgeon(s) and Role:     * Nisha Ku MD - Primary    Pre-op sina mg 81 mg Oral Daily   Fluticasone Propionate (FLONASE) 50 MCG/ACT nasal spray 2 spray 2 spray Each Nare Daily   [DISCONTINUED] Labetalol HCl (NORMODYNE) tab 100 mg 100 mg Oral Daily   methyldopa (ALDOMET) tab 1,000 mg 1,000 mg Oral TID   [DISCONTINUED] bet 14 06/17/2017   BUN 9 04/24/2017   CREATSERUM 0.77 06/17/2017   CREATSERUM 0.81 04/24/2017   GLU 75 06/17/2017   * 04/24/2017   CA 8.3 06/17/2017            Airway      Mallampati: III  Mouth opening: >3 FB  TM distance: > 6 cm  Neck ROM: full Cardi

## 2017-06-17 NOTE — PROGRESS NOTES
Report received from [facility]. Recovery was completed, now to hourly assessments on magnesium. Family at bedside. Safety measures in place. Has pumped breasts x 2 and was seen by Lactation.

## 2017-06-18 NOTE — PROGRESS NOTES
Anesthesiology         Pt is POD#1 s/p C/S under spinal with IT Duramorph for postop analgesia. Pt states no HA, BA, N/V, LE weakness or paresthesias, and no incontinence of urine or stool.   She did note some initial pruritis after the C/S, but that has s

## 2017-06-18 NOTE — PROGRESS NOTES
Magnesium was discontinued @ 0900. B/P's stable. Arriola removed. Post op check WNL. Tolerating liquids. IV saline locked. Patient up to BR, with steadying assist. Bridget Henriquez performed. Taken via w/c to NICU to see twins.  Patient pumps every 3 hours, with colo

## 2017-06-18 NOTE — PROGRESS NOTES
[facility]  Post-Partum Caesarean Section Progress Note    Dorian Amador Patient Status:  Inpatient    1985 MRN [id number]   [facility] 1NW-A Attending Rosalinda Cook MD   Hosp Day # 4 PCP Nicolasa Manriquez MD     SUBJECTIVE:

## 2017-06-18 NOTE — PROGRESS NOTES
delivered twin boys on 2017 at 5 and 0917  Boys in NICU at 28 weeks 1 day. Mother pumping regularly. BP's have been WNL with no IV BP medicine. Magnesium at 2Gms. Urinary output good. Scant lochia, Fundus firm. AM labs drawn.   Pt has stefani

## 2017-06-19 NOTE — CM/SW NOTE
06/16/17 0900   CM/SW Referral Data   Referral Source Social Work (self-referral)   Reason for Referral Psychoscial assessment   Informant Patient   SW met with pt and her  to provide support for their new born twin boy's admission into the NICU.

## 2017-06-19 NOTE — PAYOR COMM NOTE
--------------  ADMISSION REVIEW     Payor: [facility]   Subscriber #:  [id number]  Authorization Number: [id number]    Admit date: 6/14/2017 10:56 AM       Admitting Physician: Dulce Maria Meng MD  Attending Physician:  Dulce Maria Meng MD  Primary Care Ph IN LAST 1 DAY:        HYDROcodone-acetaminophen (NORCO)  MG per tab 1 tablet     Date Action Dose Route User    6/18/2017 2107 Given 1 tablet Oral Warren Hernandez RN    6/18/2017 1535 Given 1 tablet Oral Rory Diop, ELEONORA      ibuprofen (MOT

## 2017-06-19 NOTE — PROGRESS NOTES
S: pt without complaints.   Positive flatus  O: VS-Temp:  [98 °F (36.7 °C)-98.7 °F (37.1 °C)] 98.3 °F (36.8 °C)  Pulse:  [72-86] 72  Resp:  [16-20] 16  BP: (126-167)/(72-91) 127/72 mmHg       Abdomen: soft, ND, NT  Incision- CDI       Extremities: 1+ edema,

## 2017-06-19 NOTE — CM/SW NOTE
CM met with Mr and Mrs Geovanna Ortiz in [facility] room. CM review insurance and PCP with the couple.  The couple confirmed that the twin infants will be added to their [facility] insurance plan and that they are going to have [facility] be the PCP for the twins

## 2017-06-20 NOTE — PLAN OF CARE
Problem: POSTPARTUM  Goal: Optimize infant feeding at the breast  INTERVENTIONS:  - Assess support systems available to mother/family.  - Identify cultural beliefs/practices regarding lactation, letdown techniques, maternal food preferences.   - Assess moth pumps). - Encourage mother/other family members to express feelings/concerns, and actively listen. - Educate father/SO about benefits of breast feeding and how to manage common lactation challenges.   - Recommend avoidance of specific medications or subst

## 2017-06-20 NOTE — PROGRESS NOTES
Postpartum Progress Note    SUBJECTIVE:    Post-op day #3 s/p primary  section for twins and cHTN with superimposed pre-eclampsia. No overnight events. No complaints. Ambulating, tolerating PO, voiding, + flatus. Pumping.   Tearful when [employer]

## 2017-06-21 VITALS
RESPIRATION RATE: 16 BRPM | DIASTOLIC BLOOD PRESSURE: 83 MMHG | WEIGHT: 273 LBS | HEIGHT: 64 IN | HEART RATE: 78 BPM | OXYGEN SATURATION: 93 % | BODY MASS INDEX: 46.61 KG/M2 | TEMPERATURE: 98 F | SYSTOLIC BLOOD PRESSURE: 140 MMHG

## 2017-06-21 NOTE — PLAN OF CARE
BREAST FEEDING    • Optimize infant feeding at the breast Adequate for Discharge          COPING    • Pt/Family able to verbalize concerns and demonstrate effective coping strategies Completed        POSTPARTUM    • Long Term Goal:Experiences normal postpa

## 2017-06-21 NOTE — PROGRESS NOTES
REVIEWED DISCHARGE TEACHING WITH PATIENT. VERBALIZES UNDERSTANDING. PATIENT STATES FEELS CONFIDENT CARING FOR SELF AT HOME. PATIENT WILL FOLLOW UP WITH OB AS DIRECTED.

## 2017-06-21 NOTE — PROGRESS NOTES
S: pt without complaints.   Positive flatus  O: VS-Temp:  [98.2 °F (36.8 °C)-98.3 °F (36.8 °C)] 98.3 °F (36.8 °C)  Pulse:  [76-78] 78  Resp:  [16-18] 16  BP: (140-156)/(83-93) 140/83 mmHg       Abdomen: soft, ND, NT  Incision- CDI       Extremities: 1+ russel

## 2017-06-21 NOTE — DISCHARGE SUMMARY
Admission date: 6/14/17  Discharge date: 6/21/17  Admission diagnosis: 28 week twin pregnancy, chronic hypertension with superimposed severe pre-eclampsia, breech presentation of twin A  Discharge diagnosis: same  Operative Procedure: primary low transvers

## 2017-06-21 NOTE — PLAN OF CARE
Problem: POSTPARTUM  Goal: Optimize infant feeding at the breast  INTERVENTIONS:  - Assess support systems available to mother/family.  - Identify cultural beliefs/practices regarding lactation, letdown techniques, maternal food preferences.   - Assess moth and breast pumps). - Encourage mother/other family members to express feelings/concerns, and actively listen. - Educate father/SO about benefits of breast feeding and how to manage common lactation challenges.   - Recommend avoidance of specific medicatio

## 2017-06-22 PROBLEM — IMO0002: Status: RESOLVED | Noted: 2017-02-13 | Resolved: 2017-06-17

## 2017-06-26 ENCOUNTER — TELEPHONE (OUTPATIENT)
Dept: OBGYN UNIT | Facility: HOSPITAL | Age: 32
End: 2017-06-26

## 2017-10-24 PROCEDURE — 81001 URINALYSIS AUTO W/SCOPE: CPT | Performed by: INTERNAL MEDICINE

## 2018-01-12 ENCOUNTER — OFFICE VISIT (OUTPATIENT)
Dept: FAMILY MEDICINE CLINIC | Facility: CLINIC | Age: 33
End: 2018-01-12

## 2018-01-12 VITALS
OXYGEN SATURATION: 98 % | WEIGHT: 258 LBS | RESPIRATION RATE: 16 BRPM | HEIGHT: 65 IN | SYSTOLIC BLOOD PRESSURE: 126 MMHG | HEART RATE: 90 BPM | TEMPERATURE: 99 F | DIASTOLIC BLOOD PRESSURE: 72 MMHG | BODY MASS INDEX: 42.99 KG/M2

## 2018-01-12 DIAGNOSIS — Z20.828 EXPOSURE TO THE FLU: ICD-10-CM

## 2018-01-12 DIAGNOSIS — J02.9 SORE THROAT: Primary | ICD-10-CM

## 2018-01-12 LAB
CONTROL LINE PRESENT WITH A CLEAR BACKGROUND (YES/NO): YES YES/NO
STREP GRP A CUL-SCR: NEGATIVE

## 2018-01-12 PROCEDURE — 87081 CULTURE SCREEN ONLY: CPT | Performed by: NURSE PRACTITIONER

## 2018-01-12 PROCEDURE — 99213 OFFICE O/P EST LOW 20 MIN: CPT | Performed by: NURSE PRACTITIONER

## 2018-01-12 PROCEDURE — 87880 STREP A ASSAY W/OPTIC: CPT | Performed by: NURSE PRACTITIONER

## 2018-01-12 RX ORDER — OSELTAMIVIR PHOSPHATE 75 MG/1
75 CAPSULE ORAL 2 TIMES DAILY
Qty: 10 CAPSULE | Refills: 0 | Status: SHIPPED | OUTPATIENT
Start: 2018-01-12 | End: 2018-01-17

## 2018-01-12 NOTE — PROGRESS NOTES
CHIEF COMPLAINT:   Patient presents with:  Nasal Congestion: scratchy throat x 2 days, chills, coughing      HPI:   Jose Enrique Rolle is a 34year old female who presents for upper respiratory symptoms for  2 days.  Patient reports scratchy throat, cough, he /72 (BP Location: Right arm, Patient Position: Sitting, Cuff Size: large)   Pulse 90   Temp 98.7 °F (37.1 °C) (Oral)   Resp 16   Ht 65\"   Wt 258 lb   SpO2 98%   BMI 42.93 kg/m²   GENERAL: well developed, well nourished,in no apparent distress  SKIN: Risks, benefits, and side effects of medication explained and discussed. Patient Instructions       Self-Care for Sore Throats    Sore throats happen for many reasons, such as colds, allergies, and infections caused by viruses or bacteria.  In any case, · Don’t strain your vocal cords.   Call your healthcare provider  Contact your healthcare provider if you have:  · A temperature over 101°F (38.3°C)  · White spots on the throat  · Great difficulty swallowing  · Trouble breathing  · A skin rash  · Recent ex

## 2019-04-19 PROCEDURE — 82607 VITAMIN B-12: CPT | Performed by: INTERNAL MEDICINE

## 2019-04-19 PROCEDURE — 87624 HPV HI-RISK TYP POOLED RSLT: CPT | Performed by: INTERNAL MEDICINE

## 2019-04-19 PROCEDURE — 88175 CYTOPATH C/V AUTO FLUID REDO: CPT | Performed by: INTERNAL MEDICINE

## 2019-04-27 PROCEDURE — 84075 ASSAY ALKALINE PHOSPHATASE: CPT | Performed by: INTERNAL MEDICINE

## 2019-04-27 PROCEDURE — 84080 ASSAY ALKALINE PHOSPHATASES: CPT | Performed by: INTERNAL MEDICINE

## 2019-04-27 PROCEDURE — 84165 PROTEIN E-PHORESIS SERUM: CPT | Performed by: INTERNAL MEDICINE

## 2019-04-27 PROCEDURE — 86334 IMMUNOFIX E-PHORESIS SERUM: CPT | Performed by: INTERNAL MEDICINE

## 2019-04-27 PROCEDURE — 83883 ASSAY NEPHELOMETRY NOT SPEC: CPT | Performed by: INTERNAL MEDICINE

## 2019-09-20 ENCOUNTER — WALK IN (OUTPATIENT)
Dept: URGENT CARE | Age: 34
End: 2019-09-20

## 2019-09-20 VITALS
WEIGHT: 248 LBS | DIASTOLIC BLOOD PRESSURE: 82 MMHG | TEMPERATURE: 98.8 F | BODY MASS INDEX: 41.32 KG/M2 | HEIGHT: 65 IN | HEART RATE: 88 BPM | RESPIRATION RATE: 18 BRPM | SYSTOLIC BLOOD PRESSURE: 122 MMHG

## 2019-09-20 DIAGNOSIS — J01.00 ACUTE NON-RECURRENT MAXILLARY SINUSITIS: Primary | ICD-10-CM

## 2019-09-20 PROCEDURE — 99203 OFFICE O/P NEW LOW 30 MIN: CPT | Performed by: NURSE PRACTITIONER

## 2019-09-20 RX ORDER — LISINOPRIL AND HYDROCHLOROTHIAZIDE 25; 20 MG/1; MG/1
TABLET ORAL
COMMUNITY
Start: 2018-10-31

## 2019-09-20 RX ORDER — AZITHROMYCIN 250 MG/1
TABLET, FILM COATED ORAL
Qty: 6 TABLET | Refills: 0 | Status: SHIPPED | OUTPATIENT
Start: 2019-09-20 | End: 2019-09-25

## 2019-09-20 ASSESSMENT — ENCOUNTER SYMPTOMS
FATIGUE: 1
SINUS PAIN: 1
COUGH: 1
RHINORRHEA: 1
SORE THROAT: 1
EYES NEGATIVE: 1
FACIAL SWELLING: 1
FEVER: 1

## 2020-08-11 NOTE — PROGRESS NOTES
DOS: 2020  NAME: Felicita Mike   : 1953  PCP: Alexandrea Jimenez, DANIEL    Chief Complaint   Patient presents with   • Essential Tremor      SUBJECTIVE  Neurological Problem:  66 y.o. RHW male with COPD, bipolar, DM, HTN, HLD, CAD s/p stents, vitamin D deficiency, vitamin B12 deficiency, BPH, hearing loss, MARTINE on CPAP, GERD, PVD who presents for follow-up of tremor and recent hospitalization. He is unaccompanied.     Interval History:   **For previous history, please see progress notes date 3/3/20.    Mr. Mike presents has a h/o essential tremor, seen in the office in 2020, noted to be on some medications that are likely making his symptoms worse, including lithium.  He was not previously interested in any added pharmacologic agents for tremor control; however at most recent office visit was started on primidone.     He was admitted to [facility] on  following a fall and abdominal pain, was evaluated by neurology for worsening tremor, was found to have electrolyte derangement and lithium toxicity, diagnosed with my clonus, SWAPNIL.  He was initially treated with Klonopin however myoclonus improved and secondary to lethargy this was discontinued.  He was discharged on primidone 50 mg twice daily.    He presents today, is unclear of his medications but states they have not changed since his discharge.  He is no longer on lithium.  He reports some difficulty with sleep but states that his tremors are better controlled.  He does endorse some dizziness on standing, denies any stroke/TIA symptoms. He is on Seroquel, and follows up with his PCP.  He has significant COPD, is on chronic O2 but does not have his tank with him currently.  He is intermittently compliant with his CPAP.    He smokes 1/2 ppd. Some alcohol. 6-8 mountain dews per day. Recent fall.     Review of Systems:Review of Systems   Constitutional: Positive for fatigue. Negative for activity change and appetite change.   HENT: Negative for ear pain,  REV'D SELF AND INFANT CARE WITH MOM. VERBALIZES UNDERSTANDING OF INSTRUCTIONS REV'D. ENCOURAGED TO FOLLOW-UP WITH MDS AS DIRECTED AND WITH QUESTIONS. tinnitus and trouble swallowing.    Eyes: Negative for photophobia, pain and visual disturbance.   Respiratory: Positive for shortness of breath.    Musculoskeletal: Positive for gait problem. Negative for back pain and neck pain.   Neurological: Positive for dizziness, tremors and light-headedness. Negative for seizures, syncope, facial asymmetry, speech difficulty, weakness, numbness and headaches.   Psychiatric/Behavioral: Positive for decreased concentration and sleep disturbance. Negative for agitation, behavioral problems, confusion, dysphoric mood, hallucinations, self-injury and suicidal ideas. The patient is nervous/anxious. The patient is not hyperactive.     Above ROS reviewed    The following portions of the patient's history were reviewed and updated as appropriate: allergies, current medications, past family history, past medical history, past social history, past surgical history and problem list.    Current Medications:   Current Outpatient Medications:   •  acetaminophen (TYLENOL) 325 MG tablet, Take 2 tablets by mouth Every 6 (Six) Hours As Needed for Mild Pain ., Disp: , Rfl:   •  albuterol (PROVENTIL) (5 MG/ML) 0.5% nebulizer solution, Take 2.5 mg by nebulization Every 6 (Six) Hours As Needed for Wheezing., Disp: , Rfl:   •  amLODIPine (NORVASC) 5 MG tablet, One PO daily for BP--new dose and (put on file), Disp: 90 tablet, Rfl: 1  •  aspirin 81 MG tablet, Take 1 tablet by mouth Daily., Disp: , Rfl:   •  atorvastatin (Lipitor) 80 MG tablet, Take 1 tablet by mouth Daily. For cholesterol, Disp: 90 tablet, Rfl: 3  •  budesonide (PULMICORT) 0.5 MG/2ML nebulizer solution, Take 2 mL by nebulization 2 (Two) Times a Day., Disp: 120 mL, Rfl: 11  •  carvedilol (COREG) 25 MG tablet, TAKE 1 TABLET BY MOUTH TWICE DAILY WITH MEALS FOR HEART (Patient taking differently: 12.5 mg 2 (Two) Times a Day.), Disp: 180 tablet, Rfl: 3  •  cholecalciferol (VITAMIN D3) 1000 UNITS tablet, Take 1 tablet by mouth 2 (two) times  a day., Disp: , Rfl:   •  Cyanocobalamin (B-12) 1000 MCG capsule, Take 1 tablet by mouth Daily., Disp: , Rfl:   •  lisinopril (PRINIVIL,ZESTRIL) 10 MG tablet, Take 1 tablet by mouth Daily. For BP and heart, Disp: 90 tablet, Rfl: 1  •  MAGNESIUM-OXIDE 400 (241.3 Mg) MG tablet tablet, TAKE 1 TABLET BY MOUTH DAILY, Disp: 90 tablet, Rfl: 1  •  metFORMIN (GLUCOPHAGE) 1000 MG tablet, Take 1 tablet by mouth 2 (Two) Times a Day. For DMII, Disp: 180 tablet, Rfl: 1  •  omeprazole (priLOSEC) 40 MG capsule, Take 40 mg by mouth Daily., Disp: , Rfl:   •  polyethylene glycol (MIRALAX) packet, Take 17 g by mouth Daily As Needed., Disp: , Rfl:   •  promethazine (PHENERGAN) 25 MG tablet, Take 1 tablet by mouth Every 8 (Eight) Hours As Needed for Nausea., Disp: 180 tablet, Rfl: 3  •  QUEtiapine (SEROquel) 100 MG tablet, Take 1 tablet by mouth Every Night. Sleep and mood, Disp: 90 tablet, Rfl: 1  •  tamsulosin (FLOMAX) 0.4 MG capsule 24 hr capsule, Take 1 capsule by mouth Daily. For urine flow, Disp: 90 capsule, Rfl: 3  •  Vitamin E 180 MG capsule, Take  by mouth., Disp: , Rfl:   •  wheat dextrin (BENEFIBER ON THE GO) powder powder, Take 1 each by mouth Daily As Needed., Disp: , Rfl:   •  primidone (MYSOLINE) 50 MG tablet, Take 1 tablet by mouth 2 (two) times a day., Disp: 180 tablet, Rfl: 3  **I did not stop or change the above medications.  Patient's medication list was updated to reflect medications they have reported as currently taking, including medication changes made by other providers.    OBJECTIVE  Vitals:    08/11/20 1245   BP: 118/80   Pulse: 95   SpO2: 99%     Body mass index is 27.09 kg/m².    Diagnostics:  CXR 7/11/20: FINDINGS: The lungs are well-expanded with complete or nearly complete  resolution of the areas of prominent vascular congestion and  peribronchial thickening in the lower lungs noted on the study of 2 days  ago. The findings are consistent with either resolution of congestive  heart failure or somewhat  diffuse bronchitis. Resolution of CHF is  favored. The heart remains mildly enlarged.    Laboratory Results:         Lab Results   Component Value Date    WBC 8.61 07/11/2020    HGB 10.7 (L) 07/11/2020    HCT 33.2 (L) 07/11/2020    MCV 86.9 07/11/2020     07/11/2020     Lab Results   Component Value Date    GLUCOSE 114 (H) 07/11/2020    BUN 10 07/11/2020    CREATININE 0.95 07/11/2020    EGFRIFNONA 79 07/11/2020    EGFRIFAFRI 92 12/05/2019    BCR 10.5 07/11/2020    K 4.4 07/11/2020    CO2 23.8 07/11/2020    CALCIUM 9.9 07/11/2020    PROTENTOTREF 6.7 12/05/2019    ALBUMIN 4.30 07/06/2020    LABIL2 2.2 12/05/2019    AST 8 07/06/2020    ALT 8 07/06/2020     Lab Results   Component Value Date    HGBA1C 6.00 (H) 07/07/2020     No results found for: CHOL  Lab Results   Component Value Date    HDL 38 (L) 12/05/2019    HDL 41 04/29/2019    HDL 40 01/23/2019     Lab Results   Component Value Date    LDL 55 12/05/2019    LDL 53 04/29/2019    LDL 86 01/23/2019     Lab Results   Component Value Date    TRIG 123 12/05/2019    TRIG 127 04/29/2019    TRIG 171 (H) 01/23/2019     No results found for: RPR  Lab Results   Component Value Date    TSH 3.160 07/07/2020     Lab Results   Component Value Date    DTRVEYUA86 >2000 (H) 12/05/2019     Physical Exam:  GENERAL: NAD, looks older than stated age  HEENT: Normocephalic, atraumatic   COR: RRR  Resp: Effortful breathing, audible wheeze   Extremities: Significant clubbing of fingernails..   Psychiatric: Normal mood and affect.    Neurological:   MS: AO. Language normal. No neglect. Follows all commands.  CN: II-XII grossly normal except for irregular pupil on left. VFs full, EOMI. Decreased auditory acuity.  Motor: Normal strength and tone throughout, fine, low amplitude postural and intention tremor of BUE, slightly worse on the right.  Sensory: Intact to light touch in arms and legs  Station and Gait: Normal gait and station.    Coordination: Normal finger to nose  bilaterally    Impression:  Mr. Mike has a history of essential tremor that seemed to be improved on todays exam and have responded well to primidone.  Regarding his complaints of dizziness upon standing, recommended increasing his water intake, transitioning slowly.  We also discussed the importance of minimizing his caffeine, again recommended strongly to reduce his Mountain Dew intake and supplement with water. Will continue same medication regimen. He will f/u in one year, sooner if symptoms warrant.  Patient voiced understanding and agrees with plan.    Plan:     Primidone 50 mg qhs  Reduce caffeine intake  May benefit from weighted utensils.   Counseled > 5 min on importance of smoking cessation.  Follow-up in one year    Greater than 50% of this 25-minute visit was spent counseling patient regarding diagnosis, review of diagnostics, medication treatment options including purpose, risk, benefits, possible side effects as well as recommended lifestyle modifications and preventative measures.  There are no diagnoses linked to this encounter.    Coding      Dictated using Dragon

## 2020-11-05 NOTE — PROGRESS NOTES
Pt here for TTTS ultrasound accompanied by her   States +FM x 2  No complaints Wife is calling. Rufus has Covid and has had nausea and vomiting since last Thursday.  She is asking if there is something he can take?  Pharmacy is set up If needed.

## 2021-06-06 ENCOUNTER — APPOINTMENT (OUTPATIENT)
Dept: ULTRASOUND IMAGING | Age: 36
DRG: 418 | End: 2021-06-06
Attending: NURSE PRACTITIONER
Payer: COMMERCIAL

## 2021-06-06 ENCOUNTER — HOSPITAL ENCOUNTER (INPATIENT)
Facility: HOSPITAL | Age: 36
LOS: 1 days | Discharge: HOME | DRG: 418 | End: 2021-06-07
Attending: EMERGENCY MEDICINE | Admitting: INTERNAL MEDICINE
Payer: COMMERCIAL

## 2021-06-06 ENCOUNTER — ANESTHESIA EVENT (OUTPATIENT)
Dept: SURGERY | Facility: HOSPITAL | Age: 36
DRG: 418 | End: 2021-06-06
Payer: COMMERCIAL

## 2021-06-06 DIAGNOSIS — K81.0 ACUTE CHOLECYSTITIS: ICD-10-CM

## 2021-06-06 DIAGNOSIS — K81.9 CHOLECYSTITIS: Primary | ICD-10-CM

## 2021-06-06 PROCEDURE — 85025 COMPLETE CBC W/AUTO DIFF WBC: CPT | Performed by: NURSE PRACTITIONER

## 2021-06-06 PROCEDURE — 93005 ELECTROCARDIOGRAM TRACING: CPT

## 2021-06-06 PROCEDURE — 81001 URINALYSIS AUTO W/SCOPE: CPT | Performed by: EMERGENCY MEDICINE

## 2021-06-06 PROCEDURE — 81025 URINE PREGNANCY TEST: CPT

## 2021-06-06 PROCEDURE — 81015 MICROSCOPIC EXAM OF URINE: CPT | Performed by: EMERGENCY MEDICINE

## 2021-06-06 PROCEDURE — 99285 EMERGENCY DEPT VISIT HI MDM: CPT

## 2021-06-06 PROCEDURE — 80053 COMPREHEN METABOLIC PANEL: CPT | Performed by: NURSE PRACTITIONER

## 2021-06-06 PROCEDURE — 96361 HYDRATE IV INFUSION ADD-ON: CPT

## 2021-06-06 PROCEDURE — 93010 ELECTROCARDIOGRAM REPORT: CPT

## 2021-06-06 PROCEDURE — 83690 ASSAY OF LIPASE: CPT | Performed by: NURSE PRACTITIONER

## 2021-06-06 PROCEDURE — 76700 US EXAM ABDOM COMPLETE: CPT | Performed by: NURSE PRACTITIONER

## 2021-06-06 PROCEDURE — 96365 THER/PROPH/DIAG IV INF INIT: CPT

## 2021-06-06 PROCEDURE — 96375 TX/PRO/DX INJ NEW DRUG ADDON: CPT

## 2021-06-06 RX ORDER — LISINOPRIL AND HYDROCHLOROTHIAZIDE 25; 20 MG/1; MG/1
0.5 TABLET ORAL DAILY
Status: DISCONTINUED | OUTPATIENT
Start: 2021-06-07 | End: 2021-06-06 | Stop reason: SDUPTHER

## 2021-06-06 RX ORDER — MORPHINE SULFATE 2 MG/ML
1 INJECTION, SOLUTION INTRAMUSCULAR; INTRAVENOUS EVERY 2 HOUR PRN
Status: DISCONTINUED | OUTPATIENT
Start: 2021-06-06 | End: 2021-06-08

## 2021-06-06 RX ORDER — ONDANSETRON 2 MG/ML
4 INJECTION INTRAMUSCULAR; INTRAVENOUS ONCE
Status: COMPLETED | OUTPATIENT
Start: 2021-06-06 | End: 2021-06-06

## 2021-06-06 RX ORDER — ONDANSETRON 2 MG/ML
4 INJECTION INTRAMUSCULAR; INTRAVENOUS EVERY 4 HOURS PRN
Status: ACTIVE | OUTPATIENT
Start: 2021-06-06 | End: 2021-06-06

## 2021-06-06 RX ORDER — ACETAMINOPHEN 325 MG/1
650 TABLET ORAL EVERY 6 HOURS PRN
Status: DISCONTINUED | OUTPATIENT
Start: 2021-06-06 | End: 2021-06-08

## 2021-06-06 RX ORDER — ONDANSETRON 2 MG/ML
4 INJECTION INTRAMUSCULAR; INTRAVENOUS EVERY 6 HOURS PRN
Status: DISCONTINUED | OUTPATIENT
Start: 2021-06-06 | End: 2021-06-08

## 2021-06-06 RX ORDER — SODIUM PHOSPHATE, DIBASIC AND SODIUM PHOSPHATE, MONOBASIC 7; 19 G/133ML; G/133ML
1 ENEMA RECTAL ONCE AS NEEDED
Status: DISCONTINUED | OUTPATIENT
Start: 2021-06-06 | End: 2021-06-08

## 2021-06-06 RX ORDER — KETOROLAC TROMETHAMINE 15 MG/ML
15 INJECTION, SOLUTION INTRAMUSCULAR; INTRAVENOUS ONCE
Status: COMPLETED | OUTPATIENT
Start: 2021-06-06 | End: 2021-06-06

## 2021-06-06 RX ORDER — MORPHINE SULFATE 4 MG/ML
4 INJECTION, SOLUTION INTRAMUSCULAR; INTRAVENOUS EVERY 2 HOUR PRN
Status: DISCONTINUED | OUTPATIENT
Start: 2021-06-06 | End: 2021-06-08

## 2021-06-06 RX ORDER — SODIUM CHLORIDE 9 MG/ML
INJECTION, SOLUTION INTRAVENOUS CONTINUOUS
Status: DISCONTINUED | OUTPATIENT
Start: 2021-06-06 | End: 2021-06-08

## 2021-06-06 RX ORDER — SODIUM CHLORIDE 9 MG/ML
INJECTION, SOLUTION INTRAVENOUS CONTINUOUS
Status: ACTIVE | OUTPATIENT
Start: 2021-06-06 | End: 2021-06-06

## 2021-06-06 RX ORDER — DOCUSATE SODIUM 100 MG/1
100 CAPSULE, LIQUID FILLED ORAL 2 TIMES DAILY
Status: DISCONTINUED | OUTPATIENT
Start: 2021-06-06 | End: 2021-06-08

## 2021-06-06 RX ORDER — PROCHLORPERAZINE EDISYLATE 5 MG/ML
5 INJECTION INTRAMUSCULAR; INTRAVENOUS EVERY 8 HOURS PRN
Status: DISCONTINUED | OUTPATIENT
Start: 2021-06-06 | End: 2021-06-08

## 2021-06-06 RX ORDER — MELATONIN
3 NIGHTLY PRN
Status: DISCONTINUED | OUTPATIENT
Start: 2021-06-06 | End: 2021-06-08

## 2021-06-06 RX ORDER — BISACODYL 10 MG
10 SUPPOSITORY, RECTAL RECTAL
Status: DISCONTINUED | OUTPATIENT
Start: 2021-06-06 | End: 2021-06-08

## 2021-06-06 RX ORDER — MORPHINE SULFATE 2 MG/ML
2 INJECTION, SOLUTION INTRAMUSCULAR; INTRAVENOUS EVERY 2 HOUR PRN
Status: DISCONTINUED | OUTPATIENT
Start: 2021-06-06 | End: 2021-06-08

## 2021-06-06 RX ORDER — POLYETHYLENE GLYCOL 3350 17 G/17G
17 POWDER, FOR SOLUTION ORAL DAILY PRN
Status: DISCONTINUED | OUTPATIENT
Start: 2021-06-06 | End: 2021-06-08

## 2021-06-06 NOTE — ED PROVIDER NOTES
Patient Seen in: THE [facility] Emergency Department In [place]      History   Patient presents with:  Abdomen/Flank Pain    Stated Complaint: R sided abd pain began yesterday with nausea    HPI/Subjective:   HPI  Patient is 42-year-old female past medical hi noted above.     Physical Exam     ED Triage Vitals [06/06/21 1124]   /64   Pulse (!) 45   Resp 16   Temp 96.5 °F (35.8 °C)   Temp src Temporal   SpO2 100 %   O2 Device None (Room air)       Current:/61 (BP Location: Left arm)   Pulse 69   Temp URINALYSIS WITH CULTURE REFLEX - Abnormal; Notable for the following components:       Result Value    Urine Color Orange (*)     Clarity Urine Cloudy (*)     RBC Urine >10 (*)     Bacteria Urine Rare (*)     All other components within normal limits   C INDICATIONS:  Right upper quadrant tenderness  TECHNIQUE:  Real time gray-scale ultrasound was used to evaluate the abdomen. The exam includes images of the liver, gallbladder, common bile duct, pancreas, spleen, kidneys, IVC, and aorta.   PATIENT STATED H thickening. Incidental findings of 1 cm probable left lobe of liver hemangioma admission. Dr. Cheyenne Tena and Dr. Ferny Santiago notified by Dr. Jordan Bennett. Mefoxin 2 g IV piggyback administered. Patient will be transferred to our hospital via ambulance.   Margi

## 2021-06-06 NOTE — H&P
[facility] Hospitalist History and Physical      Patient presents with:  Abdomen/Flank Pain       PCP: Audra Koyanagi, MD      History of Present Illness: Patient is a 39year old female with PMH sig for HTN, nephrolithiasis presents for eval of abdominal pain. No conjunctival pallor, EOMs intact. Nose: Nares normal. Septum midline. Mucosa normal. No drainage.    Throat: Lips, mucosa, and tongue normal. Teeth and gums normal.   Neck: Supple, symmetrical, trachea midline, no cervical or supraclavicular lymph ana likely representing a hemangioma. BILIARY:  Cholelithiasis. There is gallbladder wall thickening measuring up to 4 mm. No intrahepatic or common bile duct dilatation. Common bile duct measures 2 mm. No pericholecystic fluid.   PANCREAS:  Normal. SPLEEN:

## 2021-06-06 NOTE — PROGRESS NOTES
On license of [facility] Pharmacy Note:  Renal Adjustment for cefoxitin (Alice Saavedra)    Cha Carter is a 39year old patient who has been prescribed cefoxitin (MEFOXIN) 1000 mg every 6 hrs.   The estimated creatinine clearance is 66.6 mL/min (A) (based on SCr of 1.04 mg/dL (H

## 2021-06-06 NOTE — PLAN OF CARE
Problem: Patient/Family Goals  Goal: Patient/Family Long Term Goal  Description: Patient's Long Term Goal: go home    Interventions:  - Follow POC/Interventions  -Make follow-up appts after discharge  - See additional Care Plan goals for specific interve environment to reduce risk of injury  - Provide assistive devices as appropriate  - Consider OT/PT consult to assist with strengthening/mobility  - Encourage toileting schedule  Outcome: Progressing     Problem: DISCHARGE PLANNING  Goal: Discharge to home collaborating with pharmacy to review patient's medication profile  Outcome: Progressing

## 2021-06-06 NOTE — PROGRESS NOTES
[facility] Pharmacy Note: Antimicrobial Weight Based Dose Adjustment for: cefoxitin (Dayana Infante)    Naye Larios is a 39year old patient who has been prescribed cefoxitin (MEFOXIN) 1000 mg every 6 hours.       Estimated Creatinine Clearance: 66.6 mL/min (A) (bas

## 2021-06-06 NOTE — PLAN OF CARE
NURSING ADMISSION NOTE      Patient admitted via Ambulance from [address] [place] [address] Road to room. Safety precautions initiated. Bed in low position. Call light in reach. Pt is A/Ox4. VSS, afebrile.  Pt rates pain to right/upper abdomen 2/10, declin

## 2021-06-07 ENCOUNTER — ANESTHESIA (OUTPATIENT)
Dept: SURGERY | Facility: HOSPITAL | Age: 36
DRG: 418 | End: 2021-06-07
Payer: COMMERCIAL

## 2021-06-07 VITALS
TEMPERATURE: 98 F | WEIGHT: 223 LBS | RESPIRATION RATE: 18 BRPM | BODY MASS INDEX: 37 KG/M2 | DIASTOLIC BLOOD PRESSURE: 69 MMHG | HEART RATE: 60 BPM | SYSTOLIC BLOOD PRESSURE: 117 MMHG | OXYGEN SATURATION: 94 %

## 2021-06-07 PROCEDURE — 3E0T3BZ INTRODUCTION OF ANESTHETIC AGENT INTO PERIPHERAL NERVES AND PLEXI, PERCUTANEOUS APPROACH: ICD-10-PCS | Performed by: STUDENT IN AN ORGANIZED HEALTH CARE EDUCATION/TRAINING PROGRAM

## 2021-06-07 PROCEDURE — 0FT44ZZ RESECTION OF GALLBLADDER, PERCUTANEOUS ENDOSCOPIC APPROACH: ICD-10-PCS | Performed by: SURGERY

## 2021-06-07 PROCEDURE — 85025 COMPLETE CBC W/AUTO DIFF WBC: CPT | Performed by: INTERNAL MEDICINE

## 2021-06-07 PROCEDURE — 88304 TISSUE EXAM BY PATHOLOGIST: CPT | Performed by: SURGERY

## 2021-06-07 PROCEDURE — 80048 BASIC METABOLIC PNL TOTAL CA: CPT | Performed by: INTERNAL MEDICINE

## 2021-06-07 RX ORDER — BUPIVACAINE HYDROCHLORIDE 5 MG/ML
INJECTION, SOLUTION EPIDURAL; INTRACAUDAL AS NEEDED
Status: DISCONTINUED | OUTPATIENT
Start: 2021-06-07 | End: 2021-06-07 | Stop reason: HOSPADM

## 2021-06-07 RX ORDER — DEXAMETHASONE SODIUM PHOSPHATE 4 MG/ML
VIAL (ML) INJECTION AS NEEDED
Status: DISCONTINUED | OUTPATIENT
Start: 2021-06-07 | End: 2021-06-07 | Stop reason: SURG

## 2021-06-07 RX ORDER — MEPERIDINE HYDROCHLORIDE 25 MG/ML
12.5 INJECTION INTRAMUSCULAR; INTRAVENOUS; SUBCUTANEOUS AS NEEDED
Status: DISCONTINUED | OUTPATIENT
Start: 2021-06-07 | End: 2021-06-07 | Stop reason: HOSPADM

## 2021-06-07 RX ORDER — HYDROCODONE BITARTRATE AND ACETAMINOPHEN 5; 325 MG/1; MG/1
1 TABLET ORAL AS NEEDED
Status: DISCONTINUED | OUTPATIENT
Start: 2021-06-07 | End: 2021-06-07 | Stop reason: HOSPADM

## 2021-06-07 RX ORDER — MIDAZOLAM HYDROCHLORIDE 1 MG/ML
INJECTION INTRAMUSCULAR; INTRAVENOUS AS NEEDED
Status: DISCONTINUED | OUTPATIENT
Start: 2021-06-07 | End: 2021-06-07 | Stop reason: SURG

## 2021-06-07 RX ORDER — HYDROMORPHONE HYDROCHLORIDE 1 MG/ML
0.4 INJECTION, SOLUTION INTRAMUSCULAR; INTRAVENOUS; SUBCUTANEOUS EVERY 5 MIN PRN
Status: DISCONTINUED | OUTPATIENT
Start: 2021-06-07 | End: 2021-06-07 | Stop reason: HOSPADM

## 2021-06-07 RX ORDER — TRAMADOL HYDROCHLORIDE 50 MG/1
50 TABLET ORAL EVERY 6 HOURS PRN
Qty: 30 TABLET | Refills: 0 | Status: SHIPPED | OUTPATIENT
Start: 2021-06-07 | End: 2021-06-17

## 2021-06-07 RX ORDER — NALOXONE HYDROCHLORIDE 0.4 MG/ML
80 INJECTION, SOLUTION INTRAMUSCULAR; INTRAVENOUS; SUBCUTANEOUS AS NEEDED
Status: DISCONTINUED | OUTPATIENT
Start: 2021-06-07 | End: 2021-06-07 | Stop reason: HOSPADM

## 2021-06-07 RX ORDER — MIDAZOLAM HYDROCHLORIDE 1 MG/ML
1 INJECTION INTRAMUSCULAR; INTRAVENOUS EVERY 5 MIN PRN
Status: DISCONTINUED | OUTPATIENT
Start: 2021-06-07 | End: 2021-06-07 | Stop reason: HOSPADM

## 2021-06-07 RX ORDER — NEOSTIGMINE METHYLSULFATE 1 MG/ML
INJECTION INTRAVENOUS AS NEEDED
Status: DISCONTINUED | OUTPATIENT
Start: 2021-06-07 | End: 2021-06-07 | Stop reason: SURG

## 2021-06-07 RX ORDER — LIDOCAINE HYDROCHLORIDE 10 MG/ML
INJECTION, SOLUTION EPIDURAL; INFILTRATION; INTRACAUDAL; PERINEURAL AS NEEDED
Status: DISCONTINUED | OUTPATIENT
Start: 2021-06-07 | End: 2021-06-07 | Stop reason: SURG

## 2021-06-07 RX ORDER — ALBUTEROL SULFATE 2.5 MG/3ML
2.5 SOLUTION RESPIRATORY (INHALATION) AS NEEDED
Status: DISCONTINUED | OUTPATIENT
Start: 2021-06-07 | End: 2021-06-07 | Stop reason: HOSPADM

## 2021-06-07 RX ORDER — HYDROCODONE BITARTRATE AND ACETAMINOPHEN 5; 325 MG/1; MG/1
2 TABLET ORAL AS NEEDED
Status: DISCONTINUED | OUTPATIENT
Start: 2021-06-07 | End: 2021-06-07 | Stop reason: HOSPADM

## 2021-06-07 RX ORDER — SODIUM CHLORIDE, SODIUM LACTATE, POTASSIUM CHLORIDE, CALCIUM CHLORIDE 600; 310; 30; 20 MG/100ML; MG/100ML; MG/100ML; MG/100ML
INJECTION, SOLUTION INTRAVENOUS CONTINUOUS
Status: DISCONTINUED | OUTPATIENT
Start: 2021-06-07 | End: 2021-06-07 | Stop reason: HOSPADM

## 2021-06-07 RX ORDER — KETOROLAC TROMETHAMINE 30 MG/ML
INJECTION, SOLUTION INTRAMUSCULAR; INTRAVENOUS AS NEEDED
Status: DISCONTINUED | OUTPATIENT
Start: 2021-06-07 | End: 2021-06-07 | Stop reason: SURG

## 2021-06-07 RX ORDER — GLYCOPYRROLATE 0.2 MG/ML
INJECTION, SOLUTION INTRAMUSCULAR; INTRAVENOUS AS NEEDED
Status: DISCONTINUED | OUTPATIENT
Start: 2021-06-07 | End: 2021-06-07 | Stop reason: SURG

## 2021-06-07 RX ORDER — ROCURONIUM BROMIDE 10 MG/ML
INJECTION, SOLUTION INTRAVENOUS AS NEEDED
Status: DISCONTINUED | OUTPATIENT
Start: 2021-06-07 | End: 2021-06-07 | Stop reason: SURG

## 2021-06-07 RX ORDER — LIDOCAINE HYDROCHLORIDE 10 MG/ML
INJECTION, SOLUTION INFILTRATION; PERINEURAL AS NEEDED
Status: DISCONTINUED | OUTPATIENT
Start: 2021-06-07 | End: 2021-06-07 | Stop reason: HOSPADM

## 2021-06-07 RX ORDER — ONDANSETRON 2 MG/ML
INJECTION INTRAMUSCULAR; INTRAVENOUS AS NEEDED
Status: DISCONTINUED | OUTPATIENT
Start: 2021-06-07 | End: 2021-06-07 | Stop reason: SURG

## 2021-06-07 RX ORDER — ONDANSETRON 2 MG/ML
4 INJECTION INTRAMUSCULAR; INTRAVENOUS AS NEEDED
Status: DISCONTINUED | OUTPATIENT
Start: 2021-06-07 | End: 2021-06-07 | Stop reason: HOSPADM

## 2021-06-07 RX ORDER — LABETALOL HYDROCHLORIDE 5 MG/ML
5 INJECTION, SOLUTION INTRAVENOUS EVERY 5 MIN PRN
Status: DISCONTINUED | OUTPATIENT
Start: 2021-06-07 | End: 2021-06-07 | Stop reason: HOSPADM

## 2021-06-07 RX ORDER — METOCLOPRAMIDE HYDROCHLORIDE 5 MG/ML
INJECTION INTRAMUSCULAR; INTRAVENOUS AS NEEDED
Status: DISCONTINUED | OUTPATIENT
Start: 2021-06-07 | End: 2021-06-07 | Stop reason: SURG

## 2021-06-07 RX ORDER — HYDROMORPHONE HYDROCHLORIDE 1 MG/ML
INJECTION, SOLUTION INTRAMUSCULAR; INTRAVENOUS; SUBCUTANEOUS
Status: COMPLETED
Start: 2021-06-07 | End: 2021-06-07

## 2021-06-07 RX ADMIN — SODIUM CHLORIDE: 9 INJECTION, SOLUTION INTRAVENOUS at 16:07:00

## 2021-06-07 RX ADMIN — DEXAMETHASONE SODIUM PHOSPHATE 4 MG: 4 MG/ML VIAL (ML) INJECTION at 16:13:00

## 2021-06-07 RX ADMIN — ONDANSETRON 4 MG: 2 INJECTION INTRAMUSCULAR; INTRAVENOUS at 16:45:00

## 2021-06-07 RX ADMIN — METOCLOPRAMIDE HYDROCHLORIDE 10 MG: 5 INJECTION INTRAMUSCULAR; INTRAVENOUS at 16:13:00

## 2021-06-07 RX ADMIN — LIDOCAINE HYDROCHLORIDE 50 MG: 10 INJECTION, SOLUTION EPIDURAL; INFILTRATION; INTRACAUDAL; PERINEURAL at 16:13:00

## 2021-06-07 RX ADMIN — MIDAZOLAM HYDROCHLORIDE 2 MG: 1 INJECTION INTRAMUSCULAR; INTRAVENOUS at 16:07:00

## 2021-06-07 RX ADMIN — ROCURONIUM BROMIDE 50 MG: 10 INJECTION, SOLUTION INTRAVENOUS at 16:13:00

## 2021-06-07 RX ADMIN — GLYCOPYRROLATE 0.8 MG: 0.2 INJECTION, SOLUTION INTRAMUSCULAR; INTRAVENOUS at 16:50:00

## 2021-06-07 RX ADMIN — NEOSTIGMINE METHYLSULFATE 5 MG: 1 INJECTION INTRAVENOUS at 16:50:00

## 2021-06-07 RX ADMIN — KETOROLAC TROMETHAMINE 30 MG: 30 INJECTION, SOLUTION INTRAMUSCULAR; INTRAVENOUS at 16:50:00

## 2021-06-07 NOTE — PLAN OF CARE
Patient A&Ox4. VSS. IVF continued, antibiotics initiated. NPO at midnight. Night uneventful, plan for surgery later today.    Problem: Patient/Family Goals  Goal: Patient/Family Long Term Goal  Description: Patient's Long Term Goal: Discharge    Interventio Ben Lucero RN  Outcome: Progressing     Problem: SAFETY ADULT - FALL  Goal: Free from fall injury  Description: INTERVENTIONS:  - Assess pt frequently for physical needs  - Identify cognitive and physical deficits and behaviors that affect risk of falls.   - I tube to low intermittent suction as ordered  - Evaluate effectiveness of ordered antiemetic medications  - Provide nonpharmacologic comfort measures as appropriate  - Advance diet as tolerated, if ordered  - Obtain nutritional consult as needed  - Evaluate

## 2021-06-07 NOTE — ANESTHESIA PREPROCEDURE EVALUATION
PRE-OP EVALUATION    Patient Name: Naye Larios    Admit Diagnosis: Cholecystitis [K81.9]    Pre-op Diagnosis: Acute cholecystitis [K81.0]    LAPAROSCOPIC CHOLECYSTECTOMY POSS. OPEN    Anesthesia Procedure: LAPAROSCOPIC CHOLECYSTECTOMY POSS.  OPEN (N/A IVPB-MBP, 2 g, Intravenous, Q8H        Outpatient Medications:   Lisinopril-hydroCHLOROthiazide 20-25 MG Oral Tab, Take 0.5 tablets by mouth daily. , Disp: 45 tablet, Rfl: 3, 6/6/2021 at Unknown time        Allergies: Nifedipine and Pcn [Penicillins]      A clear to auscultation bilaterally. Other findings            ASA: 2   Plan: general  NPO status verified and patient meets guidelines. Patient has not taken beta blockers in last 24 hours.   Post-procedure pain management plan discussed with

## 2021-06-07 NOTE — PLAN OF CARE
Pt taken via bed transport to surgery with spouse. Consent signed on chart. Will continue to monitor post-op.

## 2021-06-07 NOTE — PROGRESS NOTES
[facility] Hospitalist Progress Note                                                                   [facility]  4/13/1985    SUBJECTIVE: 2/10 abfominal pain. No fevers. NPO.       OBJECTI + ace  - hold ace and toradol, increased IVF, repeat BMP in AM     scd              Tamie Gasca  [facility] Hospitalist  Pager: [phone]

## 2021-06-07 NOTE — OPERATIVE REPORT
[facility]                                                         Operative Note    Jose Guadalupe Wilburn Location: [place] [address] Perioperative   CSN [id number] MRN [id number]   Admission Date 6/6/2021 Procedure Date 6/7/2021   Attending Physician Kelly Torres Toradol was given. The trochars were removed. The fascia was closed with 0 Vicryl suture. Remainder of the wounds were closed and then covered with Dermabond. The patient was awoken and taken to the recovery room in satisfactory condition.   There were

## 2021-06-07 NOTE — ANESTHESIA POSTPROCEDURE EVALUATION
[facility] Patient Status:  Inpatient   Age/Gender 39year old female MRN [id number]   Location [address] Attending Graham Brizuela MD   [address] Day # 1 PCP Geovany Arauz MD       Anesthesia Post

## 2021-06-07 NOTE — ANESTHESIA PROCEDURE NOTES
Airway  Date/Time: 6/7/2021 4:14 PM  Urgency: elective    Airway not difficult    General Information and Staff    Patient location during procedure: OR  Anesthesiologist: Jose Weaver MD  Performed: anesthesiologist     Indications and Patient Condition

## 2021-06-07 NOTE — CONSULTS
[facility]  Report of Consultation    Naye Larios Patient Status:  Inpatient    1985 MRN [id number]   [facility] 3NW-A Attending Zacarias Subramanian MD   [address] Day [address] 1 PCP García Clarek MD     2021    Reason for Consul powder packet 17 g, 17 g, Oral, Daily PRN  •  magnesium hydroxide (MILK OF MAGNESIA) 400 MG/5ML suspension 30 mL, 30 mL, Oral, Daily PRN  •  bisacodyl (DULCOLAX) rectal suppository 10 mg, 10 mg, Rectal, Daily PRN  •  Fleet Enema (FLEET) 7-19 GM/118ML enema 06/06/21  1137 06/07/21  0508   WBC 11.8* 9.4   HGB 11.7* 10.1*   MCV 85.4 87.5   .0 251.0       Recent Labs   Lab 06/06/21  1137 06/07/21  0508   * 139   K 3.5 3.7    105   CO2 28.0 29.0   BUN 12 8   CREATSERUM 1.04* 1.65*   * 10 stones     PCOS (polycystic ovarian syndrome)     Triplet pregnancy spontaneously reduced to TWINS Vikram     In vitro fertilization     Essential hypertension     Hypertension complicating pregnancy, second trimester     Pregnancy     Pregnant     Twins

## 2021-06-07 NOTE — PLAN OF CARE
Upon assessment, VSS, afebrile. Pt rates pain to right/upper abdomen 2/10, declining need for pain med currently. Abdomen is soft, nondistended and tender. Bowel sounds present x4. Pt denies flatus today. Last BM was yesterday. Pt denies nausea.  Remains NP Progressing     Problem: SAFETY ADULT - FALL  Goal: Free from fall injury  Description: INTERVENTIONS:  - Assess pt frequently for physical needs  - Identify cognitive and physical deficits and behaviors that affect risk of falls.   - Chancellor fall precaut Obtain nutritional consult as needed  - Evaluate fluid balance  Outcome: Progressing  Goal: Maintains or returns to baseline bowel function  Description: INTERVENTIONS:  - Assess bowel function  - Maintain adequate hydration with IV or PO as ordered and to

## 2021-06-08 NOTE — PLAN OF CARE
Pt returned from PACU @1810. Spouse at bedside. Pt is slightly drowsy, arouses easily to verbal stimuli. VSS, afebrile. O2 sats stable on 3LO2, will wean. Pt denies chest pain/SOB/MERCEDEZ. Lungs CTA. Abdomen is soft, nondistended and tender.  Bowel sounds prese

## 2021-06-08 NOTE — PLAN OF CARE
Pt axox4 VSS, pt had 3/10 abdominal pain, she declined any pain meds, pt lungs clear, active bowel sounds, abdominal incision dermabond VALERIA, pt was voiding urine with blood per norm (menstruating), pt tolerating eating, and drinking, pt PIV infusing normal Administer growth factors as ordered  - Implement neutropenic guidelines  Outcome: Progressing     Problem: SAFETY ADULT - FALL  Goal: Free from fall injury  Description: INTERVENTIONS:  - Assess pt frequently for physical needs  - Identify cognitive and p nonpharmacologic comfort measures as appropriate  - Advance diet as tolerated, if ordered  - Obtain nutritional consult as needed  - Evaluate fluid balance  Outcome: Progressing  Goal: Maintains or returns to baseline bowel function  Description: INTERVENT

## 2022-04-13 NOTE — PLAN OF CARE
Problem: COPING  Goal: Pt/Family able to verbalize concerns and demonstrate effective coping strategies  INTERVENTIONS:  - Assist patient/family to identify coping skills, available support systems and cultural and spiritual values  - Provide emotional sup Eleanor

## 2022-04-26 ENCOUNTER — ANESTHESIA EVENT (OUTPATIENT)
Dept: SURGERY | Facility: HOSPITAL | Age: 37
End: 2022-04-26
Payer: COMMERCIAL

## 2022-04-26 ENCOUNTER — ANESTHESIA (OUTPATIENT)
Dept: SURGERY | Facility: HOSPITAL | Age: 37
End: 2022-04-26
Payer: COMMERCIAL

## 2022-04-26 ENCOUNTER — HOSPITAL ENCOUNTER (OUTPATIENT)
Facility: HOSPITAL | Age: 37
Setting detail: OBSERVATION
Discharge: HOME | End: 2022-04-26
Attending: EMERGENCY MEDICINE | Admitting: SURGERY
Payer: COMMERCIAL

## 2022-04-26 ENCOUNTER — APPOINTMENT (OUTPATIENT)
Dept: CT IMAGING | Age: 37
End: 2022-04-26
Attending: EMERGENCY MEDICINE
Payer: COMMERCIAL

## 2022-04-26 VITALS
DIASTOLIC BLOOD PRESSURE: 54 MMHG | BODY MASS INDEX: 43.32 KG/M2 | HEIGHT: 65 IN | WEIGHT: 260 LBS | SYSTOLIC BLOOD PRESSURE: 110 MMHG | HEART RATE: 84 BPM | RESPIRATION RATE: 18 BRPM | OXYGEN SATURATION: 98 % | TEMPERATURE: 99 F

## 2022-04-26 DIAGNOSIS — K37 APPENDICITIS: ICD-10-CM

## 2022-04-26 DIAGNOSIS — R16.2 HEPATOSPLENOMEGALY: ICD-10-CM

## 2022-04-26 DIAGNOSIS — K35.30 ACUTE APPENDICITIS WITH LOCALIZED PERITONITIS, WITHOUT PERFORATION, ABSCESS, OR GANGRENE: Primary | ICD-10-CM

## 2022-04-26 LAB
ALBUMIN SERPL-MCNC: 3.2 G/DL (ref 3.4–5)
ALBUMIN/GLOB SERPL: 0.7 {RATIO} (ref 1–2)
ALP LIVER SERPL-CCNC: 91 U/L
ALT SERPL-CCNC: 32 U/L
ANION GAP SERPL CALC-SCNC: 6 MMOL/L (ref 0–18)
AST SERPL-CCNC: 15 U/L (ref 15–37)
B-HCG UR QL: NEGATIVE
BASOPHILS # BLD AUTO: 0.07 X10(3) UL (ref 0–0.2)
BASOPHILS NFR BLD AUTO: 0.6 %
BILIRUB SERPL-MCNC: 0.3 MG/DL (ref 0.1–2)
BILIRUB UR QL STRIP.AUTO: NEGATIVE
BUN BLD-MCNC: 12 MG/DL (ref 7–18)
CALCIUM BLD-MCNC: 8.7 MG/DL (ref 8.5–10.1)
CHLORIDE SERPL-SCNC: 105 MMOL/L (ref 98–112)
CLARITY UR REFRACT.AUTO: CLEAR
CO2 SERPL-SCNC: 25 MMOL/L (ref 21–32)
COLOR UR AUTO: YELLOW
CREAT BLD-MCNC: 1.03 MG/DL
EOSINOPHIL # BLD AUTO: 0.11 X10(3) UL (ref 0–0.7)
EOSINOPHIL NFR BLD AUTO: 0.9 %
ERYTHROCYTE [DISTWIDTH] IN BLOOD BY AUTOMATED COUNT: 14.2 %
GLOBULIN PLAS-MCNC: 4.9 G/DL (ref 2.8–4.4)
GLUCOSE BLD-MCNC: 116 MG/DL (ref 70–99)
GLUCOSE UR STRIP.AUTO-MCNC: NEGATIVE MG/DL
HCT VFR BLD AUTO: 37.4 %
HGB BLD-MCNC: 12.1 G/DL
IMM GRANULOCYTES # BLD AUTO: 0.06 X10(3) UL (ref 0–1)
IMM GRANULOCYTES NFR BLD: 0.5 %
KETONES UR STRIP.AUTO-MCNC: NEGATIVE MG/DL
LEUKOCYTE ESTERASE UR QL STRIP.AUTO: NEGATIVE
LIPASE SERPL-CCNC: 79 U/L (ref 73–393)
LYMPHOCYTES # BLD AUTO: 1.84 X10(3) UL (ref 1–4)
LYMPHOCYTES NFR BLD AUTO: 14.6 %
MCH RBC QN AUTO: 27.1 PG (ref 26–34)
MCHC RBC AUTO-ENTMCNC: 32.4 G/DL (ref 31–37)
MCV RBC AUTO: 83.9 FL
MONOCYTES # BLD AUTO: 0.69 X10(3) UL (ref 0.1–1)
MONOCYTES NFR BLD AUTO: 5.5 %
NEUTROPHILS # BLD AUTO: 9.82 X10 (3) UL (ref 1.5–7.7)
NEUTROPHILS # BLD AUTO: 9.82 X10(3) UL (ref 1.5–7.7)
NEUTROPHILS NFR BLD AUTO: 77.9 %
NITRITE UR QL STRIP.AUTO: NEGATIVE
OSMOLALITY SERPL CALC.SUM OF ELEC: 283 MOSM/KG (ref 275–295)
PH UR STRIP.AUTO: 5.5 [PH] (ref 5–8)
PLATELET # BLD AUTO: 309 10(3)UL (ref 150–450)
POTASSIUM SERPL-SCNC: 4.1 MMOL/L (ref 3.5–5.1)
PROT SERPL-MCNC: 8.1 G/DL (ref 6.4–8.2)
RBC # BLD AUTO: 4.46 X10(6)UL
RBC UR QL AUTO: NEGATIVE
SARS-COV-2 RNA RESP QL NAA+PROBE: NOT DETECTED
SODIUM SERPL-SCNC: 136 MMOL/L (ref 136–145)
SP GR UR STRIP.AUTO: >=1.03 (ref 1–1.03)
UROBILINOGEN UR STRIP.AUTO-MCNC: 0.2 MG/DL
WBC # BLD AUTO: 12.6 X10(3) UL (ref 4–11)

## 2022-04-26 PROCEDURE — 74177 CT ABD & PELVIS W/CONTRAST: CPT | Performed by: EMERGENCY MEDICINE

## 2022-04-26 PROCEDURE — 0DTJ4ZZ RESECTION OF APPENDIX, PERCUTANEOUS ENDOSCOPIC APPROACH: ICD-10-PCS | Performed by: SURGERY

## 2022-04-26 PROCEDURE — 96375 TX/PRO/DX INJ NEW DRUG ADDON: CPT

## 2022-04-26 PROCEDURE — 96361 HYDRATE IV INFUSION ADD-ON: CPT

## 2022-04-26 PROCEDURE — 80053 COMPREHEN METABOLIC PANEL: CPT | Performed by: EMERGENCY MEDICINE

## 2022-04-26 PROCEDURE — 81025 URINE PREGNANCY TEST: CPT

## 2022-04-26 PROCEDURE — 81001 URINALYSIS AUTO W/SCOPE: CPT | Performed by: EMERGENCY MEDICINE

## 2022-04-26 PROCEDURE — 96374 THER/PROPH/DIAG INJ IV PUSH: CPT

## 2022-04-26 PROCEDURE — 88304 TISSUE EXAM BY PATHOLOGIST: CPT | Performed by: SURGERY

## 2022-04-26 PROCEDURE — 96376 TX/PRO/DX INJ SAME DRUG ADON: CPT

## 2022-04-26 PROCEDURE — 85025 COMPLETE CBC W/AUTO DIFF WBC: CPT | Performed by: EMERGENCY MEDICINE

## 2022-04-26 PROCEDURE — S0030 INJECTION, METRONIDAZOLE: HCPCS | Performed by: PHYSICIAN ASSISTANT

## 2022-04-26 PROCEDURE — 99285 EMERGENCY DEPT VISIT HI MDM: CPT

## 2022-04-26 PROCEDURE — 83690 ASSAY OF LIPASE: CPT | Performed by: EMERGENCY MEDICINE

## 2022-04-26 RX ORDER — IOHEXOL 350 MG/ML
100 INJECTION, SOLUTION INTRAVENOUS
Status: COMPLETED | OUTPATIENT
Start: 2022-04-26 | End: 2022-04-26

## 2022-04-26 RX ORDER — SODIUM CHLORIDE 9 MG/ML
INJECTION, SOLUTION INTRAVENOUS CONTINUOUS
Status: DISCONTINUED | OUTPATIENT
Start: 2022-04-26 | End: 2022-04-27

## 2022-04-26 RX ORDER — ONDANSETRON 2 MG/ML
4 INJECTION INTRAMUSCULAR; INTRAVENOUS AS NEEDED
Status: DISCONTINUED | OUTPATIENT
Start: 2022-04-26 | End: 2022-04-26 | Stop reason: HOSPADM

## 2022-04-26 RX ORDER — HYDROMORPHONE HYDROCHLORIDE 1 MG/ML
0.4 INJECTION, SOLUTION INTRAMUSCULAR; INTRAVENOUS; SUBCUTANEOUS EVERY 2 HOUR PRN
Status: DISCONTINUED | OUTPATIENT
Start: 2022-04-26 | End: 2022-04-26

## 2022-04-26 RX ORDER — HYDROMORPHONE HYDROCHLORIDE 1 MG/ML
0.4 INJECTION, SOLUTION INTRAMUSCULAR; INTRAVENOUS; SUBCUTANEOUS EVERY 5 MIN PRN
Status: DISCONTINUED | OUTPATIENT
Start: 2022-04-26 | End: 2022-04-26 | Stop reason: HOSPADM

## 2022-04-26 RX ORDER — DEXAMETHASONE SODIUM PHOSPHATE 4 MG/ML
VIAL (ML) INJECTION AS NEEDED
Status: DISCONTINUED | OUTPATIENT
Start: 2022-04-26 | End: 2022-04-26 | Stop reason: SURG

## 2022-04-26 RX ORDER — MIDAZOLAM HYDROCHLORIDE 1 MG/ML
1 INJECTION INTRAMUSCULAR; INTRAVENOUS EVERY 5 MIN PRN
Status: DISCONTINUED | OUTPATIENT
Start: 2022-04-26 | End: 2022-04-26 | Stop reason: HOSPADM

## 2022-04-26 RX ORDER — ONDANSETRON 2 MG/ML
INJECTION INTRAMUSCULAR; INTRAVENOUS AS NEEDED
Status: DISCONTINUED | OUTPATIENT
Start: 2022-04-26 | End: 2022-04-26 | Stop reason: SURG

## 2022-04-26 RX ORDER — ONDANSETRON 2 MG/ML
4 INJECTION INTRAMUSCULAR; INTRAVENOUS EVERY 6 HOURS PRN
Status: DISCONTINUED | OUTPATIENT
Start: 2022-04-26 | End: 2022-04-27

## 2022-04-26 RX ORDER — HYDROCODONE BITARTRATE AND ACETAMINOPHEN 5; 325 MG/1; MG/1
1-2 TABLET ORAL EVERY 4 HOURS PRN
Qty: 20 TABLET | Refills: 0 | Status: SHIPPED | OUTPATIENT
Start: 2022-04-26

## 2022-04-26 RX ORDER — ROCURONIUM BROMIDE 10 MG/ML
INJECTION, SOLUTION INTRAVENOUS AS NEEDED
Status: DISCONTINUED | OUTPATIENT
Start: 2022-04-26 | End: 2022-04-26 | Stop reason: SURG

## 2022-04-26 RX ORDER — HYDROMORPHONE HYDROCHLORIDE 1 MG/ML
0.5 INJECTION, SOLUTION INTRAMUSCULAR; INTRAVENOUS; SUBCUTANEOUS EVERY 30 MIN PRN
Status: ACTIVE | OUTPATIENT
Start: 2022-04-26 | End: 2022-04-26

## 2022-04-26 RX ORDER — METRONIDAZOLE 500 MG/100ML
500 INJECTION, SOLUTION INTRAVENOUS EVERY 8 HOURS
Status: DISCONTINUED | OUTPATIENT
Start: 2022-04-26 | End: 2022-04-27

## 2022-04-26 RX ORDER — IBUPROFEN 800 MG/1
800 TABLET ORAL EVERY 8 HOURS PRN
Qty: 20 TABLET | Refills: 1 | Status: SHIPPED | OUTPATIENT
Start: 2022-04-26 | End: 2022-06-25

## 2022-04-26 RX ORDER — ONDANSETRON 2 MG/ML
4 INJECTION INTRAMUSCULAR; INTRAVENOUS EVERY 6 HOURS PRN
Status: DISCONTINUED | OUTPATIENT
Start: 2022-04-26 | End: 2022-04-26

## 2022-04-26 RX ORDER — HYDROMORPHONE HYDROCHLORIDE 1 MG/ML
0.4 INJECTION, SOLUTION INTRAMUSCULAR; INTRAVENOUS; SUBCUTANEOUS EVERY 2 HOUR PRN
Status: DISCONTINUED | OUTPATIENT
Start: 2022-04-26 | End: 2022-04-27

## 2022-04-26 RX ORDER — LIDOCAINE HYDROCHLORIDE 10 MG/ML
INJECTION, SOLUTION EPIDURAL; INFILTRATION; INTRACAUDAL; PERINEURAL AS NEEDED
Status: DISCONTINUED | OUTPATIENT
Start: 2022-04-26 | End: 2022-04-26 | Stop reason: SURG

## 2022-04-26 RX ORDER — LABETALOL HYDROCHLORIDE 5 MG/ML
5 INJECTION, SOLUTION INTRAVENOUS EVERY 5 MIN PRN
Status: DISCONTINUED | OUTPATIENT
Start: 2022-04-26 | End: 2022-04-26 | Stop reason: HOSPADM

## 2022-04-26 RX ORDER — CEFOXITIN 2 G/1
INJECTION, POWDER, FOR SOLUTION INTRAVENOUS AS NEEDED
Status: DISCONTINUED | OUTPATIENT
Start: 2022-04-26 | End: 2022-04-26 | Stop reason: SURG

## 2022-04-26 RX ORDER — DEXTROSE AND SODIUM CHLORIDE 5; .45 G/100ML; G/100ML
INJECTION, SOLUTION INTRAVENOUS CONTINUOUS
Status: DISCONTINUED | OUTPATIENT
Start: 2022-04-26 | End: 2022-04-27

## 2022-04-26 RX ORDER — METOCLOPRAMIDE HYDROCHLORIDE 5 MG/ML
10 INJECTION INTRAMUSCULAR; INTRAVENOUS AS NEEDED
Status: DISCONTINUED | OUTPATIENT
Start: 2022-04-26 | End: 2022-04-26 | Stop reason: HOSPADM

## 2022-04-26 RX ORDER — KETOROLAC TROMETHAMINE 30 MG/ML
INJECTION, SOLUTION INTRAMUSCULAR; INTRAVENOUS AS NEEDED
Status: DISCONTINUED | OUTPATIENT
Start: 2022-04-26 | End: 2022-04-26 | Stop reason: SURG

## 2022-04-26 RX ORDER — ACETAMINOPHEN 325 MG/1
650 TABLET ORAL
Status: DISCONTINUED | OUTPATIENT
Start: 2022-04-26 | End: 2022-04-27

## 2022-04-26 RX ORDER — HYDROCODONE BITARTRATE AND ACETAMINOPHEN 5; 325 MG/1; MG/1
1 TABLET ORAL AS NEEDED
Status: DISCONTINUED | OUTPATIENT
Start: 2022-04-26 | End: 2022-04-26 | Stop reason: HOSPADM

## 2022-04-26 RX ORDER — MEPERIDINE HYDROCHLORIDE 25 MG/ML
INJECTION INTRAMUSCULAR; INTRAVENOUS; SUBCUTANEOUS
Status: COMPLETED
Start: 2022-04-26 | End: 2022-04-26

## 2022-04-26 RX ORDER — NALOXONE HYDROCHLORIDE 0.4 MG/ML
80 INJECTION, SOLUTION INTRAMUSCULAR; INTRAVENOUS; SUBCUTANEOUS AS NEEDED
Status: DISCONTINUED | OUTPATIENT
Start: 2022-04-26 | End: 2022-04-26 | Stop reason: HOSPADM

## 2022-04-26 RX ORDER — LEVOFLOXACIN 5 MG/ML
750 INJECTION, SOLUTION INTRAVENOUS EVERY 24 HOURS
Status: DISCONTINUED | OUTPATIENT
Start: 2022-04-26 | End: 2022-04-27

## 2022-04-26 RX ORDER — MORPHINE SULFATE 4 MG/ML
4 INJECTION, SOLUTION INTRAMUSCULAR; INTRAVENOUS ONCE
Status: COMPLETED | OUTPATIENT
Start: 2022-04-26 | End: 2022-04-26

## 2022-04-26 RX ORDER — HYDROCODONE BITARTRATE AND ACETAMINOPHEN 5; 325 MG/1; MG/1
2 TABLET ORAL AS NEEDED
Status: DISCONTINUED | OUTPATIENT
Start: 2022-04-26 | End: 2022-04-26 | Stop reason: HOSPADM

## 2022-04-26 RX ORDER — HYDROMORPHONE HYDROCHLORIDE 1 MG/ML
0.8 INJECTION, SOLUTION INTRAMUSCULAR; INTRAVENOUS; SUBCUTANEOUS EVERY 2 HOUR PRN
Status: DISCONTINUED | OUTPATIENT
Start: 2022-04-26 | End: 2022-04-27

## 2022-04-26 RX ORDER — METOCLOPRAMIDE HYDROCHLORIDE 5 MG/ML
INJECTION INTRAMUSCULAR; INTRAVENOUS AS NEEDED
Status: DISCONTINUED | OUTPATIENT
Start: 2022-04-26 | End: 2022-04-26 | Stop reason: SURG

## 2022-04-26 RX ORDER — SODIUM CHLORIDE 9 MG/ML
INJECTION, SOLUTION INTRAVENOUS CONTINUOUS
Status: ACTIVE | OUTPATIENT
Start: 2022-04-26 | End: 2022-04-26

## 2022-04-26 RX ORDER — ONDANSETRON 2 MG/ML
4 INJECTION INTRAMUSCULAR; INTRAVENOUS EVERY 4 HOURS PRN
Status: DISPENSED | OUTPATIENT
Start: 2022-04-26 | End: 2022-04-26

## 2022-04-26 RX ORDER — ONDANSETRON 2 MG/ML
4 INJECTION INTRAMUSCULAR; INTRAVENOUS ONCE
Status: COMPLETED | OUTPATIENT
Start: 2022-04-26 | End: 2022-04-26

## 2022-04-26 RX ORDER — OXYCODONE HYDROCHLORIDE 10 MG/1
10 TABLET ORAL EVERY 4 HOURS PRN
Status: DISCONTINUED | OUTPATIENT
Start: 2022-04-26 | End: 2022-04-27

## 2022-04-26 RX ORDER — ACETAMINOPHEN 500 MG
1000 TABLET ORAL ONCE AS NEEDED
Status: DISCONTINUED | OUTPATIENT
Start: 2022-04-26 | End: 2022-04-26 | Stop reason: HOSPADM

## 2022-04-26 RX ORDER — SODIUM CHLORIDE, SODIUM LACTATE, POTASSIUM CHLORIDE, CALCIUM CHLORIDE 600; 310; 30; 20 MG/100ML; MG/100ML; MG/100ML; MG/100ML
INJECTION, SOLUTION INTRAVENOUS CONTINUOUS
Status: DISCONTINUED | OUTPATIENT
Start: 2022-04-26 | End: 2022-04-26 | Stop reason: HOSPADM

## 2022-04-26 RX ORDER — MEPERIDINE HYDROCHLORIDE 25 MG/ML
12.5 INJECTION INTRAMUSCULAR; INTRAVENOUS; SUBCUTANEOUS AS NEEDED
Status: COMPLETED | OUTPATIENT
Start: 2022-04-26 | End: 2022-04-26

## 2022-04-26 RX ORDER — HYDROMORPHONE HYDROCHLORIDE 1 MG/ML
0.2 INJECTION, SOLUTION INTRAMUSCULAR; INTRAVENOUS; SUBCUTANEOUS EVERY 2 HOUR PRN
Status: DISCONTINUED | OUTPATIENT
Start: 2022-04-26 | End: 2022-04-26

## 2022-04-26 RX ORDER — BUPIVACAINE HYDROCHLORIDE 5 MG/ML
INJECTION, SOLUTION EPIDURAL; INTRACAUDAL AS NEEDED
Status: DISCONTINUED | OUTPATIENT
Start: 2022-04-26 | End: 2022-04-26 | Stop reason: HOSPADM

## 2022-04-26 RX ORDER — HYDROMORPHONE HYDROCHLORIDE 1 MG/ML
0.8 INJECTION, SOLUTION INTRAMUSCULAR; INTRAVENOUS; SUBCUTANEOUS EVERY 2 HOUR PRN
Status: DISCONTINUED | OUTPATIENT
Start: 2022-04-26 | End: 2022-04-26

## 2022-04-26 RX ORDER — OXYCODONE HYDROCHLORIDE 5 MG/1
5 TABLET ORAL EVERY 4 HOURS PRN
Status: DISCONTINUED | OUTPATIENT
Start: 2022-04-26 | End: 2022-04-27

## 2022-04-26 RX ORDER — PROCHLORPERAZINE EDISYLATE 5 MG/ML
5 INJECTION INTRAMUSCULAR; INTRAVENOUS EVERY 8 HOURS PRN
Status: DISCONTINUED | OUTPATIENT
Start: 2022-04-26 | End: 2022-04-27

## 2022-04-26 RX ORDER — CLINDAMYCIN HYDROCHLORIDE 300 MG/1
300 CAPSULE ORAL 3 TIMES DAILY
Qty: 15 CAPSULE | Refills: 0 | Status: SHIPPED | OUTPATIENT
Start: 2022-04-26 | End: 2022-05-06

## 2022-04-26 RX ORDER — KETOROLAC TROMETHAMINE 30 MG/ML
30 INJECTION, SOLUTION INTRAMUSCULAR; INTRAVENOUS EVERY 6 HOURS
Status: DISCONTINUED | OUTPATIENT
Start: 2022-04-27 | End: 2022-04-27

## 2022-04-26 RX ADMIN — CEFOXITIN 2 G: 2 INJECTION, POWDER, FOR SOLUTION INTRAVENOUS at 17:49:00

## 2022-04-26 RX ADMIN — KETOROLAC TROMETHAMINE 30 MG: 30 INJECTION, SOLUTION INTRAMUSCULAR; INTRAVENOUS at 18:01:00

## 2022-04-26 RX ADMIN — ROCURONIUM BROMIDE 30 MG: 10 INJECTION, SOLUTION INTRAVENOUS at 17:41:00

## 2022-04-26 RX ADMIN — METOCLOPRAMIDE HYDROCHLORIDE 10 MG: 5 INJECTION INTRAMUSCULAR; INTRAVENOUS at 17:46:00

## 2022-04-26 RX ADMIN — DEXAMETHASONE SODIUM PHOSPHATE 8 MG: 4 MG/ML VIAL (ML) INJECTION at 17:32:00

## 2022-04-26 RX ADMIN — ONDANSETRON 4 MG: 2 INJECTION INTRAMUSCULAR; INTRAVENOUS at 17:46:00

## 2022-04-26 RX ADMIN — SODIUM CHLORIDE: 9 INJECTION, SOLUTION INTRAVENOUS at 18:19:00

## 2022-04-26 RX ADMIN — LIDOCAINE HYDROCHLORIDE 50 MG: 10 INJECTION, SOLUTION EPIDURAL; INFILTRATION; INTRACAUDAL; PERINEURAL at 17:32:00

## 2022-04-26 NOTE — PROGRESS NOTES
NURSING ADMISSION NOTE      Patient admitted via Cart  Oriented to room. Safety precautions initiated. Bed in low position. Call light in reach. Patient arrived to floor at 1230 in stable condition. Admission database completed.

## 2022-04-26 NOTE — PLAN OF CARE
Patient A&Ox4, RA, up ad bull. IVF infusing per mar. Receiving dilaudid for pain. Consent signed. Plan of care discussed w/ patient and family at bedside. 1625: Patient transported to OR in stable condition. Accompanied by spouse. Problem: PAIN - ADULT  Goal: Verbalizes/displays adequate comfort level or patient's stated pain goal  Description: INTERVENTIONS:  - Encourage pt to monitor pain and request assistance  - Assess pain using appropriate pain scale  - Administer analgesics based on type and severity of pain and evaluate response  - Implement non-pharmacological measures as appropriate and evaluate response  - Consider cultural and social influences on pain and pain management  - Manage/alleviate anxiety  - Utilize distraction and/or relaxation techniques  - Monitor for opioid side effects  - Notify MD/LIP if interventions unsuccessful or patient reports new pain  - Anticipate increased pain with activity and pre-medicate as appropriate  Outcome: Progressing     Problem: RISK FOR INFECTION - ADULT  Goal: Absence of fever/infection during anticipated neutropenic period  Description: INTERVENTIONS  - Monitor WBC  - Administer growth factors as ordered  - Implement neutropenic guidelines  Outcome: Progressing     Problem: SAFETY ADULT - FALL  Goal: Free from fall injury  Description: INTERVENTIONS:  - Assess pt frequently for physical needs  - Identify cognitive and physical deficits and behaviors that affect risk of falls.   - [place] fall precautions as indicated by assessment.  - Educate pt/family on patient safety including physical limitations  - Instruct pt to call for assistance with activity based on assessment  - Modify environment to reduce risk of injury  - Provide assistive devices as appropriate  - Consider OT/PT consult to assist with strengthening/mobility  - Encourage toileting schedule  Outcome: Progressing     Problem: DISCHARGE PLANNING  Goal: Discharge to home or other facility with appropriate resources  Description: INTERVENTIONS:  - Identify barriers to discharge w/pt and caregiver  - Include patient/family/discharge partner in discharge planning  - Arrange for needed discharge resources and transportation as appropriate  - Identify discharge learning needs (meds, wound care, etc)  - Arrange for interpreters to assist at discharge as needed  - Consider post-discharge preferences of patient/family/discharge partner  - Complete POLST form as appropriate  - Assess patient's ability to be responsible for managing their own health  - Refer to Case Management Department for coordinating discharge planning if the patient needs post-hospital services based on physician/LIP order or complex needs related to functional status, cognitive ability or social support system  Outcome: Progressing     Problem: Altered Communication/Language Barrier  Goal: Patient/Family is able to understand and participate in their care  Description: Interventions:  - Assess communication ability and preferred communication style  - Implement communication aides and strategies  - Use visual cues when possible  - Listen attentively, be patient, do not interrupt  - Minimize distractions  - Allow time for understanding and response  - Establish method for patient to ask for assistance (call light)  - Provide an  as needed  - Communicate barriers and strategies to overcome with those who interact with patient  Outcome: Progressing

## 2022-04-26 NOTE — ANESTHESIA PROCEDURE NOTES
Airway  Date/Time: 4/26/2022 5:33 PM  Urgency: elective    Airway not difficult    General Information and Staff    Patient location during procedure: OR  Anesthesiologist: Armando Finley MD  Performed: anesthesiologist     Indications and Patient Condition  Indications for airway management: anesthesia  Sedation level: deep  Preoxygenated: yes  Patient position: sniffing  Mask difficulty assessment: 1 - vent by mask    Final Airway Details  Final airway type: endotracheal airway      Successful airway: ETT  Cuffed: yes   Successful intubation technique: direct laryngoscopy  Endotracheal tube insertion site: oral  Blade: Ramses  Blade size: #4  ETT size (mm): 7.0    Cormack-Lehane Classification: grade IIA - partial view of glottis  Placement verified by: chest auscultation and capnometry   Measured from: lips  ETT to lips (cm): 21  Number of attempts at approach: 1

## 2022-04-26 NOTE — ED QUICK NOTES
Orders for admission, patient is aware of plan and ready to go upstairs. Any questions, please call ED RN  Ursula Landry at extension [phone]. Vaccinated?  yes  Type of COVID test sent:rapid  COVID Suspicion level: Low      Titratable drug(s) infusing:n/a  Rate:    LOC at time of transport:alert    Other pertinent information:acute appy  Morphine 8mg total given  CIWA score=n/a  NIH score=n/a

## 2022-04-26 NOTE — BRIEF OP NOTE
Pre-Operative Diagnosis: Appendicitis [K37]     Post-Operative Diagnosis: Appendicitis [K37]      Procedure Performed:   LAPAROSCOPIC APPENDECTOMY    Surgeon(s) and Role:     Adrián Corrales MD - Primary    Assistant(s):  Surgical Assistant.: KELVIN Carreno     Surgical Findings: slightly suppurative (retrocecal)     Specimen: appendix     Estimated Blood Loss: Blood Output: 5 mL (4/26/2022  5:51 PM)    Dictation Number:  Jamila Kline MD  4/26/2022  6:21 PM

## 2022-04-27 NOTE — PROGRESS NOTES
NURSING DISCHARGE NOTE    Discharged Home via Wheelchair. Accompanied by Spouse  Belongings Taken by patient/family. Pt alert and oriented. VSS. Discharge paperwork and education given to pt and spouse. All questions and concerns addressed. IV discontinued. pt left in stable condition w/ transport and spouse.

## 2022-04-27 NOTE — OPERATIVE REPORT
[facility]    PATIENT'S NAME: Geri Borjas   ATTENDING PHYSICIAN: Valeri Maldonado M.D. OPERATING PHYSICIAN: Valeri Maldonado M.D. PATIENT ACCOUNT#:   [de-identified]    LOCATION:  [address] [place] [address]  MEDICAL RECORD #:   [id number]       YOB: 1985  ADMISSION DATE:       04/26/2022      OPERATION DATE:  04/26/2022    OPERATIVE REPORT    PREOPERATIVE DIAGNOSIS:  Acute appendicitis. POSTOPERATIVE DIAGNOSIS:  Acute appendicitis. PROCEDURE:  Laparoscopic appendectomy. ASSISTANT:  KELVIN Lam. ANESTHESIA:  General.    ESTIMATED BLOOD LOSS:  5 mL. SPECIMEN:  Appendix. DISPOSITION:  To PACU. COMPLICATIONS:  None. INDICATIONS:  Patient is a 26-year-old female, presented to the emergency room with an abdominal pain that began about 5 o'clock this morning. It was more in the central area of the abdomen, gradually migrating to the right lower quadrant area with nausea, sweating. Patient had a CT scan of abdomen and pelvis. It was consistent with acute appendicitis. So, based on this, discussed with the patient and her  undergoing laparoscopic appendectomy, possible open. The risks and benefits of surgery were discussed and patient agrees with the procedure, signed the informed consent. FINDINGS:  Slightly suppurative appendix. OPERATIVE TECHNIQUE:  Patient was brought to the operating room, was placed in supine position on the operating table. Lower extremity SCD boots were placed. After IV sedation and endotracheal intubation, patient's abdomen was prepped into a sterile field. Patient voided prior to entering the operating room. Then local anesthetic was injected in the supraumbilical region. Incision was made. Abdomen was elevated. Veress needle was inserted. Abdomen was insufflated with CO2. Then, 5 mm Visiport trocar was inserted under the direct vision, another 5 mm port in the suprapubic, and a 12 mm port between the two 5 mm ports, midline, was placed. Appendix was seen. It was retrocecal.  There was some fibrinous exudate, and using the Harmonic scalpel, the mesoappendix was divided as well as the surrounding adhesions, and the appendix was elevated. A small amount of pus was obtained from the appendix which was suctioned immediately. Then, after the base of the appendix was clearly visualized, a vascular linear stapler was used, transected, and removed. Appendix was placed in the Endo Catch bag and extracted and then the saline solution was used. The area copiously irrigated. The staple line had good hemostasis and no bleeding. After the irrigation was complete, Endo Close device was used to close the fascia on the 12 mm trocar site. All the trocars were removed. Abdomen was completely deflated. Then, 4-0 Vicryl was used to close the incision, then it was covered with Dermabond. The patient tolerated the procedure well, was extubated in the operating room, and transferred to PACU in stable condition. At the end of the case, needle, instrument, and sponge counts were all correct. The surgical assistant was medically necessary to position the patient and prepare the patient for the surgery as well as to hold the camera and retract the wound and then assist in closing the incisions and then transferring the patient out of the operating room.     Dictated By Ambika Fernandez M.D.  d: 04/26/2022 18:25:05  t: 04/26/2022 20:35:10  Job [id number]  NATALIA/

## 2023-12-06 NOTE — PROGRESS NOTES
[facility] - Cardiac Medical ICU  Critical Care Medicine  Progress Note    Patient Name: Francois Otero Sr.  MRN: [id number]  Admission Date: 12/5/2023  Hospital Length of Stay: 1 days  Code Status: Full Code  Attending Provider: Rupinder Fraser MD  Primary Care Provider: Carley Kothari MD   Principal Problem: COPD exacerbation    Subjective:     HPI:  78M with PMHx of CAD, hyperlipidemia, hypertension and COPD on 5 L of O2 at home presents to the ED c/o worsening shortness of breath for the last several days worse with exertion. Patient reports chronic dyspnea mostly with exertion, uses oxygen consistently with exertion and at night but does not always use it at rest. Baseline SpO2 88-92%. Reports that for the last several days he has noticed decreased exercise tolerance and worsening shortness of breath. Has prednisone at home for emergencies and reports using prednisone 40mg daily for the last 3 days without improvement in his symptoms. Has had prior COPD exacerbations in the past, and this is similar. Denies having any recent sick contacts. Denies having any other symptoms, including worse cough or fevers. Reports consistent use of his inhalers, but more as rescue inhaler and not daily maintenance inhalers. Pt is prior smoker.     ED course: Afebrile, HDS, tachycardic, requiring BiPAP 10/5 30% and satting >90%. CBC significant for WBC 23k, CMP with mild acidosis, nl renal function, covid/flu/rsv negative, BNP/trop negative, VBG 7.43/37/49/25. CXR with hyperinflated lung, emphysematous changes, no consolidation. Admit to MICU for COPD exacerbation requiring BiPAP.    Hospital/ICU Course:  78M with PMHx of CAD, hyperlipidemia, hypertension and COPD on 5 L of O2 at home presents to the ED c/o worsening shortness of breath for the last several days worse with exertion.  Admitted to MICU for COPD exacerbation requiring BiPAP. Started on solumedrol, duonebs, and levaquin. Working to wean BiPAP    Interval  Indication: follow-up for fetal growth, mono di twins. ____________________________________________________________________________  History: Age: 27 years.  : 2 Para: 0.  ____________________________________________________________________________ History/Significant Events: NAEON. Afebrile. HDS. Satting well on BiPAP    VBG this morning on BiPAP 7.32/59/56/31. Trial of NC this morning, still with increased WOB. Will continue Bipap for now    WBC improved.    Review of Systems   Constitutional:  Positive for activity change. Negative for diaphoresis and fever.   Respiratory:  Positive for cough, shortness of breath and wheezing.    Cardiovascular:  Negative for chest pain and leg swelling.   Skin:  Negative for rash.   Neurological:  Negative for syncope.   Psychiatric/Behavioral:  Negative for agitation.      Objective:     Vital Signs (Most Recent):  Temp: 97.5 °F (36.4 °C) (12/06/23 0701)  Pulse: 80 (12/06/23 0901)  Resp: (!) 41 (12/06/23 0901)  BP: (!) 175/102 (12/06/23 0901)  SpO2: 95 % (12/06/23 0901) Vital Signs (24h Range):  Temp:  [97.5 °F (36.4 °C)-98.7 °F (37.1 °C)] 97.5 °F (36.4 °C)  Pulse:  [] 80  Resp:  [16-67] 41  SpO2:  [87 %-100 %] 95 %  BP: (117-220)/() 175/102   Weight: 50.4 kg (111 lb 1.8 oz)  Body mass index is 20.32 kg/m².      Intake/Output Summary (Last 24 hours) at 12/6/2023 1113  Last data filed at 12/6/2023 0901  Gross per 24 hour   Intake 399.69 ml   Output 300 ml   Net 99.69 ml          Physical Exam  Vitals and nursing note reviewed.   Constitutional:       General: He is not in acute distress.     Appearance: He is ill-appearing.      Comments: BiPAP in place   HENT:      Head: Atraumatic.      Mouth/Throat:      Mouth: Mucous membranes are dry.   Eyes:      Conjunctiva/sclera: Conjunctivae normal.   Cardiovascular:      Rate and Rhythm: Regular rhythm. Tachycardia present.   Pulmonary:      Comments: Accessory muscle use. Diffuse end expiratory wheeze  Abdominal:      Palpations: Abdomen is soft.      Tenderness: There is no abdominal tenderness.   Musculoskeletal:         General: No swelling or tenderness. Normal range of motion.   Skin:     General: Skin is warm and dry.   Neurological:      General: No focal  anatomy survey appears normal but limited and fetal growth is appropriate for each fetus.   Triplet A is seen and is closest to the cervix.    ____________________________________________________________________________  Doppler:  Fetal Doppler:  Fetus 1: deficit present.      Mental Status: He is alert and oriented to person, place, and time.   Psychiatric:         Mood and Affect: Mood normal.         Behavior: Behavior normal.            Vents:  Oxygen Concentration (%): 40 (12/06/23 0901)  Lines/Drains/Airways       Drain  Duration             Male External Urinary Catheter 12/05/23 2200 Small <1 day              Peripheral Intravenous Line  Duration                  Peripheral IV - Single Lumen 12/05/23 1519 20 G Left Antecubital <1 day         Peripheral IV - Single Lumen 12/05/23 2030 20 G Posterior;Right Forearm <1 day                  Significant Labs:    CBC/Anemia Profile:  Recent Labs   Lab 12/05/23  1519 12/06/23  0333   WBC 22.99* 15.73*   HGB 15.5 13.8*   HCT 45.7 41.2    202   MCV 87 88   RDW 15.2* 15.1*        Chemistries:  Recent Labs   Lab 12/05/23  1519 12/06/23  0333    140   K 4.8 4.2    105   CO2 20* 22*   BUN 14 16   CREATININE 0.9 1.1   CALCIUM 10.0 9.1   ALBUMIN 4.3 3.7   PROT 8.0 6.8   BILITOT 0.4 0.4   ALKPHOS 122 103   ALT 11 9*   AST 18 15   MG 1.9 1.8   PHOS  --  3.4       All pertinent labs within the past 24 hours have been reviewed.    Significant Imaging:  I have reviewed all pertinent imaging results/findings within the past 24 hours.    ABG  Recent Labs   Lab 12/06/23  0458   PH 7.324*   PO2 56   PCO2 59.3*   HCO3 30.9*   BE 5*     Assessment/Plan:     Pulmonary  * COPD exacerbation  Shortness of Breath    Because the patient is experiencing an acute worsening in baseline symptoms (such as cough, dyspnea, and/or sputum production) beyond normal daily variations to an extent that requires a change in therapy, they are in an acute COPD exacerbation.  Patient is is on oxygen at 5 L/min per nasal cannula..  They are currently exhibiting the following signs of a severe exacerbation: accessory respiratory muscle use and paradoxical chest wall movement.  As such, their exacerbation is classified as severe.    Admit to  discussed. We also discussed the potential risks and benefits of low dose aspirin and anti-hypertensive medication. She understands that a better diet, weight loss and routine exercise can help her blood pressure for the rest of her life.   We discussed Anticipate delivery at 36-37 weeks for mono/di twins    Monochorionic Twins were discussed and I explained the following -  The following fetal complications are of particular concern in monochorionic twin pregnancies:  · Twin-twin transfusion syndrome (TT MICU due to his need for continuous BiPAP  Ensure pt is on COPD Pathway and follow Global Initiative for Chronic Obstructive Lung Disease (GOLD) guidelines  Bronchodilators:   - Duonebs q4h   - Continue home LABA or start a LABA ASAP prior to DC  Steroids: Prednisone 40 mg/day PO for 5 days, or IV should patient not be able to tolerate PO  Antibiotics: Levaquin 750 mg daily x 5 days.  Would ideally give CAP coverage with Zithromax  + CTX but penicillin is listed as an allergy  Supplemental oxygen with SpO2 goal > 88%  Wean BiPAP as soon as possible  Smoking cessation education        Cardiac/Vascular  Hypertension  Listed on problem list, but no antihypertensives listed on home meds list.  SBP > 200 on presentation, down to 140s after 2 mg IV Ativan given.    Losartan 25mg qd  PRN Hydralazine IV for SBP > 170  Will need close PCP follow-up after DC    Other  History of benzodiazepine use  Anxiety    Prescribed Xanax 0.25 mg QHS PRN for anxiety since at least 2020.  Unable to access  at this time.  Given 1 mg Ativan IV x 2 in ED.    Xanax 1mg prn       Critical Care Daily Checklist:    A: Awake: RASS Goal/Actual Goal:    Actual:     B: Spontaneous Breathing Trial Performed?     C: SAT & SBT Coordinated?  N/a                      D: Delirium: CAM-ICU Overall CAM-ICU: Negative   E: Early Mobility Performed? Yes   F: Feeding Goal:    Status:     Current Diet Order   Procedures    Diet NPO      AS: Analgesia/Sedation Xanax prn   T: Thromboembolic Prophylaxis lvx   H: HOB > 300 Yes   U: Stress Ulcer Prophylaxis (if needed) none   G: Glucose Control ssi   B: Bowel Function     I: Indwelling Catheter (Lines & Akhtar) Necessity Piv x2   D: De-escalation of Antimicrobials/Pharmacotherapies levaquin    Plan for the day/ETD Wean bipap    Code Status:  Family/Goals of Care: Full Code         Critical secondary to Patient has a condition that poses threat to life and bodily function: AHRF requiring NIPPV      Critical care was  risk factor for discordant growth due to unequal sharing of the placenta or TTTS. Monochorionic placentation also appears to have a small independent adverse effect on intrauterine growth compared with dichorionic placentation.     The suggested approach to time spent personally by me on the following activities: development of treatment plan with patient or surrogate and bedside caregivers, discussions with consultants, evaluation of patient's response to treatment, examination of patient, ordering and performing treatments and interventions, ordering and review of laboratory studies, ordering and review of radiographic studies, pulse oximetry, re-evaluation of patient's condition. This critical care time did not overlap with that of any other provider or involve time for any procedures.     Nahid Conley MD  Critical Care Medicine  [facility] - Cardiac Medical [facility]   Greater than 50% of this time was spent in face to face discussion with the patient.

## 2024-01-01 NOTE — PROGRESS NOTES
Pt complaints: denies HA, visual c/o. Active FM. No complaints today.      /78 mmHg  Pulse 76  Temp(Src) 98.2 °F (36.8 °C) (Temporal)  Resp 16  Ht 5' 4\" (1.626 m)  Wt 270 lb (122.471 kg)  BMI 46.32 kg/m2  LMP 11/29/2016    Abdomen: gravid, nontender - may have an elongated or misshapen head.  The head is shaped according to the birth canal for easier birth.  This is called molding of the head and will round out in a few days.

## (undated) DEVICE — CLOSURE EXOFIN 1.0ML

## (undated) DEVICE — SLEEVE KENDALL SCD EXPRESS MED

## (undated) DEVICE — STANDARD HYPODERMIC NEEDLE,POLYPROPYLENE HUB: Brand: MONOJECT

## (undated) DEVICE — SCD SLEEVE KNEE HI BLEND

## (undated) DEVICE — SUTURE VICRYL 0

## (undated) DEVICE — STERILE SYNTHETIC POLYISOPRENE POWDER-FREE SURGICAL GLOVES WITH HYDROGEL COATING, SMOOTH FINISH, STRAIGHT FINGER: Brand: PROTEXIS

## (undated) DEVICE — TROCAR: Brand: KII FIOS FIRST ENTRY

## (undated) DEVICE — MEGADYNE ELECTRODE ADULT PT RT

## (undated) DEVICE — EXOFIN TISSUE ADHESIVE 1.0ML

## (undated) DEVICE — VIOLET BRAIDED (POLYGLACTIN 910), SYNTHETIC ABSORBABLE SUTURE: Brand: COATED VICRYL

## (undated) DEVICE — APPLICATOR COTTON TIP 6IN

## (undated) DEVICE — SUTURE MONOCRYL 4-0 PS-2

## (undated) DEVICE — SOLUTION  .9 1000ML BTL

## (undated) DEVICE — THE ECHELON FLEX POWERED PLUS ARTICULATING ENDOSCOPIC LINEAR CUTTERS ARE STERILE, SINGLE PATIENT USE INSTRUMENTS THAT SIMULTANEOUSLYCUT AND STAPLE TISSUE. THERE ARE SIX STAGGERED ROWS OF STAPLES, THREE ON EITHER SIDE OF THE CUT LINE. THE ECHELON FLEX 45 POWERED PLUSINSTRUMENTS HAVE A STAPLE LINE THAT IS APPROXIMATELY 45 MM LONG AND A CUT LINE THAT IS APPROXIMATELY 42 MM LONG. THE SHAFT CAN ROTATE FREELYIN BOTH DIRECTIONS AND AN ARTICULATION MECHANISM ENABLES THE DISTAL PORTION OF THE SHAFT TO PIVOT TO FACILITATE LATERAL ACCESS TO THE OPERATIVESITE.THE INSTRUMENTS ARE PACKAGED WITH A PRIMARY LITHIUM BATTERY PACK THAT MUST BE INSTALLED PRIOR TO USE. THERE ARE SPECIFIC REQUIREMENTS FORDISPOSING OF THE BATTERY PACK. REFER TO THE BATTERY PACK DISPOSAL SECTION.THE INSTRUMENTS ARE PACKAGED WITHOUT A RELOAD AND MUST BE LOADED PRIOR TO USE. A STAPLE RETAINING CAP ON THE RELOAD PROTECTS THE STAPLE LEGPOINTS DURING SHIPPING AND TRANSPORTATION. THE INSTRUMENTS’ LOCK-OUT FEATURE IS DESIGNED TO PREVENT A USED OR IMPROPERLY INSTALLED RELOADFROM BEING REFIRED OR AN INSTRUMENT FROM BEING FIRED WITHOUT A RELOAD.: Brand: ECHELON FLEX

## (undated) DEVICE — TRAY SURESTEP 16 BARDEX UMETR

## (undated) DEVICE — UNDYED BRAIDED (POLYGLACTIN 910), SYNTHETIC ABSORBABLE SUTURE: Brand: COATED VICRYL

## (undated) DEVICE — CHLORAPREP 26ML APPLICATOR

## (undated) DEVICE — TROCARS: Brand: KII® BLUNT TIP ACCESS SYSTEM

## (undated) DEVICE — SOL  .9 1000ML BAG

## (undated) DEVICE — SYRINGE 20CC LL TIP

## (undated) DEVICE — SOL  .9 1000ML BTL

## (undated) DEVICE — LIGHT HANDLE

## (undated) DEVICE — 40580 - THE PINK PAD - ADVANCED TRENDELENBURG POSITIONING KIT: Brand: 40580 - THE PINK PAD - ADVANCED TRENDELENBURG POSITIONING KIT

## (undated) DEVICE — TROCAR: Brand: KII® SLEEVE

## (undated) DEVICE — HARMONIC 1100 SHEARS, 36CM SHAFT LENGTH: Brand: HARMONIC

## (undated) DEVICE — LAP CHOLE/APPY CDS-LF: Brand: MEDLINE INDUSTRIES, INC.

## (undated) DEVICE — DISPOSABLE LAPAROSCOPIC CLIP APPLIER WITH 20 CLIPS.: Brand: EPIX® UNIVERSAL CLIP APPLIER

## (undated) DEVICE — ANCHOR TISSUE RETRIEVAL SYSTEM, BAG SIZE 175 ML, PORT SIZE 10 MM: Brand: ANCHOR TISSUE RETRIEVAL SYSTEM

## (undated) DEVICE — INSUFFLATION NEEDLE TO ESTABLISH PNEUMOPERITONEUM.: Brand: INSUFFLATION NEEDLE

## (undated) DEVICE — STERILE SYNTHETIC POLYISOPRENE POWDER-FREE SURGICAL GLOVES WITH HYDROGEL COATING: Brand: PROTEXIS

## (undated) NOTE — Clinical Note
1.  Weekly NST by 32 weeks      2. Monthly growth US         3.  level 2 US at 20wks 4. fetal echocardiogram at 24wks 5. Cervical length in 2 weeks at her next TTTS evaluation 6.   TTTS checks every 2 weeks; add MCA Dopplers at 26 weeks to screen for ane

## (undated) NOTE — Clinical Note
RECOMMENDATIONS: Continue care with Dr. Rona Oconnell Continue aspirin 81 mg p.o.  Daily Continue home blood pressure monitoring Continue methyldopa 250 mg p.o. 3 times daily Cervical length assessment at 16 weeks TTTS assessment every two weeks beginning at 16 we

## (undated) NOTE — Clinical Note
RECOMMENDATIONS:  Continue care with Dr. Annetta Newberry  Continue aspirin 81 mg p.o.  Daily  Continue home blood pressure monitoring  Continue methyldopa 250 mg p.o. 3 times daily TTTS assessment every two weeks   assess AFV and bladder in both twins   measurement

## (undated) NOTE — LETTER
Stephanie Spicer 89  [address] [facility] [address] [facility]  Authorization for Surgical Operation and Procedure     Date:___________                                                                                                         Time:__________ that can occur: fever and allergic reactions, hemolytic reactions, transmission of diseases such as Hepatitis, AIDS and Cytomegalovirus (CMV) and fluid overload.   In the event that I wish to have an autologous transfusion of my own blood, or a directed don when the applicable recovery period ends for purposes of reinstating the DNAR order.   10. Patients having a sterilization procedure: I understand that if the procedure is successful the results will be permanent and it will therefore be impossible for me t to give me medicine and do additional procedures as necessary. Some examples are: Starting or using an “IV” to give me medicine, fluids or blood during my procedure, and having a breathing tube placed to help me breathe when I’m asleep (intubation).  In the that rare but potential complications include headache, bleeding, infection, seizure, irregular heart rhythms, and nerve injury.     I can change my mind about having anesthesia services at any time before I get the medicine.    ____________________________

## (undated) NOTE — MR AVS SNAPSHOT
After Visit Summary   2017    Monika Olvera    MRN: [id number]           Visit Information        Provider Department Dept Phone    2017  2:00 PM [address] PN[facility]   Outpt [phone]      Your Vitals Were     BP Pulse Wt LMP [address] [facility]    2017 9:50 AM Gerri SEGOVIA [facility]    2017 3:00 PM [place]     2017 10:20 AM Mehul Andrew [facility]    2017 9:10 AM Shelley,

## (undated) NOTE — Clinical Note
1.  Weekly NST by 32 weeks      2. Monthly growth US         3. TTTS checks every 2 weeks; add MCA Dopplers at 26 weeks to screen for anemia/polythythemia 4. fetal echocardiogram at 24wks 5.   Anticipate delivery at 37 weeks for mono/di twins

## (undated) NOTE — MR AVS SNAPSHOT
After Visit Summary   3/20/2017    Ronnie Kim    MRN: [id number]           Visit Information        Provider Department Dept Phone    3/20/2017  3:00 PM [address] [facility]   Outpt [phone]      Your Vitals Were     BP Pulse Wt LMP 5/17/2017 5:15 PM Christina PAYNE [facility] - [facility]    5/31/2017 5:15 PM Christina PAYNE OB/GYNLUCIO - [facility]      Follow-up Instructions     Return in about 2 days (around 3/22/2017) for CL & TTTS eval.

## (undated) NOTE — MR AVS SNAPSHOT
After Visit Summary   2017    Rosanna Head    MRN: [id number]           Visit Information        Provider Department Dept Phone    2017  8:30 AM [facility]   Outpt [phone]      Your Vitals Were     BP Pulse Wt LMP 6/28/2017 9:10 AM Belem Ravi [facility]    7/6/2017 10:30 AM Elyssa Phillips [facility]    7/13/2017 9:50 AM Katty AZAR [facility]    7/20/2017

## (undated) NOTE — MR AVS SNAPSHOT
After Visit Summary   2017    Dorian Amador    MRN: [id number]           Visit Information        Provider Department Dept Phone    2017  2:00 PM [facility]   Outpt [phone]      Your Vitals Were     BP Pulse Wt LMP 5/31/2017 5:15 PM Mirela White P [facility]    6/14/2017 9:50 AM Bi SEGOVIA [facility]    6/21/2017 10:20 AM Raysaus Osuna [facility]    6/28/2017 9: that time, our department will begin to work on any necessary authorizations that may be needed for your exam. Please note that the approval process could take 3-5 days.  Failure to obtain required authorization numbers may create reimbursement difficulties listed above, you are responsible for obtaining any prior authorization required by your benefit plan. [facility]                   [facility] now offers Video Visits through [address] for adult and pediatric patients.   Video Visits

## (undated) NOTE — MR AVS SNAPSHOT
After Visit Summary   2017    Javier Martinez    MRN: [id number]           Visit Information        Provider Department Dept Phone    2017  2:30 PM [facility]   Outpt [phone]      Your Vitals Were     BP Pulse Wt LMP 2017 2:00 PM Zuleyka     2017 4:50 PM Gemma SEGOVIA [facility] - [address] [place] [address]    2017 2:00 PM Zuleyka     2017 5:15 PM Robyn AYERSG OB/GYNE - [facility] listed above, you are responsible for obtaining any prior authorization required by your benefit plan. [facility] - [facility]                   [facility] now offers Video Visits through [address] for adult and pediatric patients.   Video Visits

## (undated) NOTE — MR AVS SNAPSHOT
After Visit Summary   2017    Angely Lehman    MRN: [id number]           Visit Information        Provider Department Dept Phone    2017  1:00 PM [address] PNORM[address] [place]  Outpt [phone]      Your Vitals Were     BP Pulse Ht Wt BMI LMP US, PREG UTER, REAL TIME W/IMAGE DOCUMENT, FETAL & MATERNAL, 1ST TRIMEST, TRANSABDOM; EA ADD'L GEST [90694 CPT(R)]  2/13/2017 (Approximate) 2/13/2018    US, PREG UTER, REAL TIME W/IMAGE DOCUMENT, FETAL & MATERNAL, 1ST TRIMEST, TRANSABDOM; EA ADD'L GEST [7 Important note regarding exams that require a referral/authorization: As a service to you, the [facility] will obtain any necessary prior authorization required by your benefit plan provided you choose to have your test performed at one of the lo your test performed at one of the locations listed above. To initiate the precertification process, please call our scheduling department at [phone] to secure an appointment time with one of our facilities.  At that time, our department will begin to w authorizations that may be needed for your exam. Please note that the approval process could take 3-5 days. Failure to obtain required authorization numbers may create reimbursement difficulties for you.      If you choose to have this test performed at a l listed above, you are responsible for obtaining any prior authorization required by your benefit plan.       [facility] - We Care For You                  Educational Information     Healthy Diet and Regular Exercise  The Foundation of [address] If you receive a survey from [employer], please take a few minutes to complete it and provide feedback. We strive to deliver the best patient experience and are looking for ways to make improvements. Your feedback will help us do so.  For more information

## (undated) NOTE — MR AVS SNAPSHOT
[address]  [address] [facility] [address] [phone]               Thank you for choosing us for your health care visit with Gracie Mercedes PA-C.   We are glad to serve you and happy to provide you with this summa 120/80 mmHg 69 98.3 °F (36.8 °C) (Oral) 272 lb           Current Medications          This list is accurate as of: 6/12/17  8:59 AM.  Always use your most recent med list.                aspirin 81 MG Tabs   Take 81 mg by mouth daily.            [place]

## (undated) NOTE — LETTER
Stephanie Spicer [address]  [address] [facility] [address], [address] [facility]  Authorization for Surgical Operation and Procedure     Date:___________                                                                                                         Time:__________ risks that can occur: fever and allergic reactions, hemolytic reactions, transmission of diseases such as Hepatitis, AIDS and Cytomegalovirus (CMV) and fluid overload.   In the event that I wish to have an autologous transfusion of my own blood, or a direct determine when the applicable recovery period ends for purposes of reinstating the DNAR order.   10. Patients having a sterilization procedure: I understand that if the procedure is successful the results will be permanent and it will therefore be impossibl doctor) to give me medicine and do additional procedures as necessary.  Some examples are: Starting or using an “IV” to give me medicine, fluids or blood during my procedure, and having a breathing tube placed to help me breathe when I’m asleep (intubation) understand that rare but potential complications include headache, bleeding, infection, seizure, irregular heart rhythms, and nerve injury.     I can change my mind about having anesthesia services at any time before I get the medicine.    _________________

## (undated) NOTE — ED AVS SNAPSHOT
[facility] Emergency Department    [facility] [address] [address]    Phone:  [phone]    Fax:  [phone]           Mrs. Ramu Dunn   MRN: [id number]    Department:  [facility] Emergency Department   Date of Visit: IF THERE IS ANY CHANGE OR WORSENING OF YOUR CONDITION, CALL YOUR PRIMARY CARE PHYSICIAN AT ONCE OR RETURN IMMEDIATELY TO THE EMERGENCY DEPARTMENT.     If you have been prescribed any medication(s), please fill your prescription right away and begin taking t

## (undated) NOTE — Clinical Note
1.  Weekly NST by 32 weeks      2. Monthly growth US         3.  level 2 US at 20wks 4. fetal echocardiogram at 24wks 5. Cervical length in 4-5wks 6. TTTS checks every 2 weeks starting at 15wks 7.   Daily aspirin 81mg while pregnant which has been shown

## (undated) NOTE — ED AVS SNAPSHOT
THE [facility] Emergency Department in [address] [facility]    Phone:  [phone]    Fax:  [phone]           Mrs. Opal Massey   MRN: [id number]    Department:  THE [facility] Emergency Department in [place]   Date of [address] [facility] [address]            Jul 13, 2017  9:50 AM   OB with Nehemiah Dudley MD   [facility] - [facility] AT [facility])    [address]   [phone] doctor. Please ask your health care provider, pharmacist or nurse if you have any questions regarding your home medications, including potential side effects. Medication List      Notice     You have not been prescribed any medications. primary care or specialist physician will see patients referred from the [facility] Emergency Department. Follow-up care is at the discretion of that Physician.     IF THERE IS ANY CHANGE OR WORSENING OF YOUR CONDITION, CALL YOUR PRIMARY CARE PHYSICIAN harming yourself, contact [address] [place] [address] at [phone]. - If you don’t have insurance, Alicia Khoury has partnered with [employer] [address] to help you get signed up for insurance coverage.   Patient [facility] discharge instructions in [facility] by going to Visits < Admission Summaries. If you've been to the Emergency Department or your doctor's office, you can view your past visit information in [facility] by going to Visits < Visit Summaries. [employer] questions?

## (undated) NOTE — IP AVS SNAPSHOT
[facility] [place] [address] [place], [address] ~ [phone]                Discharge Summary   6/14/2017    Mrs. Judy Fowler           Admission Information        Provider Department    6/14/2017 Naomi Dahl MD  [id number] [    ]    [    ]    [    ]                  Preeclampsia/Hypertension       Preeclampsia is a serious disease related to high blood pressure (hypertension). Preeclampsia/hypertension can happen during pregnancy and up to 6 weeks following childbirth.   Co 24.7 (L) (06/18/17)  88.5 -- -- -- (06/18/17)  214.0 (04/24/17)  11.6 (H)    (06/17/17)  12.4 (06/17/17)  3.34 (L) (06/17/17)  9.9 (L) (06/17/17)  30.5 (L) (06/17/17)  91.3    (06/17/17)  251.0     (06/16/17)  16.2 (H) (06/16/17)  3.47 (L) (06/16/17)  10. 5 Breastfeeding / pumping journal Done   Breastfeeding suggestions for home Active   Engorgement / sore nipple prevention and management Active   NICU / SCN - Establishing a milk supply for infant in the NICU / SCN Done   Outpatient lactation clinic informat Community Resources Done         Handwashing & Infection Prevention  Resolved   Handwashing and Respiratory Hygiene Resolved               Additional Information       Call your doctor if you have feelings of anxiety, sadness or hopelessness after two week office, you can view your past visit information in [employer] by going to Visits < Visit Summaries. [employer] questions? Call [phone] for help. [facility] is NOT to be used for urgent needs. For medical emergencies, dial 911.

## (undated) NOTE — Clinical Note
IMPRESSION: IUP at 18w3d Monochorionic DICHORIONIC diamniotic twin gestation  Initial dichorionic tri-amniotic triplet gestation with spontaneous reduction to twin gestation  IVF gestation  Chronic hypertension:  on methyldopa 1000 mg p.o. 3 times daily, O

## (undated) NOTE — Clinical Note
IMPRESSION: IUP at 18w3d Monochorionic diamniotic twin gestation  Initial dichorionic tri-amniotic triplet gestation with spontaneous reduction to twin gestation  IVF gestation  Chronic hypertension:  on methyldopa 1000 mg p.o. 3 times daily, OB recently a

## (undated) NOTE — ED AVS SNAPSHOT
[facility] Emergency Department    [place] [address] [address]    Phone:  [phone]    Fax:  [phone]           Mrs. Ramakrishna Vaughan   MRN: [id number]    Department:  [facility] Emergency Department   Date of Visit: [facility] - [facility] ([facility] AT [facility])    [address] [place] [address]   [phone]            May 31, 2017  5:15 PM   OB with Digna Alvarado MD   [facility] - [place] [facility] ([facility] AT Expect to receive an electronic request (by e-mail or text) to complete a self-assessment the day after your visit. You may also receive a call from our patient liason soon after your visit.  Also, some patients receive a detailed feedback survey mailed to [address] [phone] [address] ([address] [facility] [id number] ([address]) [address] [place] [address] FETAL NUMBER:  3     FETUS A:  HEART RATE:  No heart tones identified. Crown-rump length: 2.2 cm with a gestational age of 8 weeks 6 days.      FETUS B:  HEART RATE:  168 bpm  Crown-rump length: 40 cm with estimated gestational age of 5 weeks zero days

## (undated) NOTE — ED AVS SNAPSHOT
[facility] Emergency Department in [address] [facility]    Phone:  [phone]    Fax:  [phone]           Mrs. Arthur Joe   MRN: [id number]    Department:  [facility] in [place]   Date of IF THERE IS ANY CHANGE OR WORSENING OF YOUR CONDITION, CALL YOUR PRIMARY CARE PHYSICIAN AT ONCE OR RETURN IMMEDIATELY TO THE EMERGENCY DEPARTMENT.     If you have been prescribed any medication(s), please fill your prescription right away and begin taking t